# Patient Record
Sex: FEMALE | Race: WHITE | NOT HISPANIC OR LATINO | Employment: UNEMPLOYED | URBAN - METROPOLITAN AREA
[De-identification: names, ages, dates, MRNs, and addresses within clinical notes are randomized per-mention and may not be internally consistent; named-entity substitution may affect disease eponyms.]

---

## 2022-09-13 ENCOUNTER — TELEPHONE (OUTPATIENT)
Dept: OBSTETRICS AND GYNECOLOGY | Facility: CLINIC | Age: 27
End: 2022-09-13
Payer: COMMERCIAL

## 2022-09-13 NOTE — TELEPHONE ENCOUNTER
Called and scheduled an annual appointment Oct 5 at 8:00 am. Pt agreed with time and date.KP        ----- Message from Monica Butler sent at 9/13/2022  3:15 PM CDT -----  Name of Who is Calling: TIFFANY LUCERO [[id number]]           What is the request in detail: Patient is requesting a call back to schedule a NP appointment.  Please assist.           Can the clinic reply by [facility]: No           What Number to Call Back if not in [facility]: [phone]

## 2022-10-05 ENCOUNTER — PATIENT MESSAGE (OUTPATIENT)
Dept: OBSTETRICS AND GYNECOLOGY | Facility: CLINIC | Age: 27
End: 2022-10-05

## 2022-10-05 ENCOUNTER — OFFICE VISIT (OUTPATIENT)
Dept: OBSTETRICS AND GYNECOLOGY | Facility: CLINIC | Age: 27
End: 2022-10-05
Attending: ADVANCED PRACTICE MIDWIFE
Payer: COMMERCIAL

## 2022-10-05 ENCOUNTER — LAB VISIT (OUTPATIENT)
Dept: LAB | Facility: OTHER | Age: 27
End: 2022-10-05
Attending: ADVANCED PRACTICE MIDWIFE
Payer: COMMERCIAL

## 2022-10-05 VITALS
SYSTOLIC BLOOD PRESSURE: 108 MMHG | WEIGHT: 164.38 LBS | BODY MASS INDEX: 27.39 KG/M2 | DIASTOLIC BLOOD PRESSURE: 70 MMHG | HEIGHT: 65 IN

## 2022-10-05 DIAGNOSIS — Z78.9 VEGETARIAN DIET: ICD-10-CM

## 2022-10-05 DIAGNOSIS — Z13.0 SCREENING FOR IRON DEFICIENCY ANEMIA: ICD-10-CM

## 2022-10-05 DIAGNOSIS — Z91.89 AT RISK FOR BREASTFEEDING DIFFICULTY: ICD-10-CM

## 2022-10-05 DIAGNOSIS — Z87.42 HX OF ABNORMAL CERVICAL PAP SMEAR: ICD-10-CM

## 2022-10-05 DIAGNOSIS — Z01.419 ENCOUNTER FOR GYNECOLOGICAL EXAMINATION WITHOUT ABNORMAL FINDING: ICD-10-CM

## 2022-10-05 DIAGNOSIS — R63.5 WEIGHT GAIN: ICD-10-CM

## 2022-10-05 DIAGNOSIS — Z12.4 SCREENING FOR MALIGNANT NEOPLASM OF CERVIX: ICD-10-CM

## 2022-10-05 DIAGNOSIS — R79.0 DECREASED FERRITIN: ICD-10-CM

## 2022-10-05 DIAGNOSIS — Z31.69 ENCOUNTER FOR PRECONCEPTION CONSULTATION: ICD-10-CM

## 2022-10-05 DIAGNOSIS — Z01.419 ENCOUNTER FOR GYNECOLOGICAL EXAMINATION WITHOUT ABNORMAL FINDING: Primary | ICD-10-CM

## 2022-10-05 DIAGNOSIS — R74.01 ELEVATED ALANINE AMINOTRANSFERASE (ALT) LEVEL: Primary | ICD-10-CM

## 2022-10-05 LAB
ALBUMIN SERPL BCP-MCNC: 4.4 G/DL (ref 3.5–5.2)
ALP SERPL-CCNC: 70 U/L (ref 55–135)
ALT SERPL W/O P-5'-P-CCNC: 60 U/L (ref 10–44)
ANION GAP SERPL CALC-SCNC: 8 MMOL/L (ref 8–16)
AST SERPL-CCNC: 40 U/L (ref 10–40)
BASOPHILS # BLD AUTO: 0.04 K/UL (ref 0–0.2)
BASOPHILS NFR BLD: 0.7 % (ref 0–1.9)
BILIRUB SERPL-MCNC: 0.5 MG/DL (ref 0.1–1)
BUN SERPL-MCNC: 8 MG/DL (ref 6–20)
CALCIUM SERPL-MCNC: 9.3 MG/DL (ref 8.7–10.5)
CHLORIDE SERPL-SCNC: 107 MMOL/L (ref 95–110)
CO2 SERPL-SCNC: 24 MMOL/L (ref 23–29)
CREAT SERPL-MCNC: 0.8 MG/DL (ref 0.5–1.4)
DIFFERENTIAL METHOD: NORMAL
EOSINOPHIL # BLD AUTO: 0.1 K/UL (ref 0–0.5)
EOSINOPHIL NFR BLD: 1.8 % (ref 0–8)
ERYTHROCYTE [DISTWIDTH] IN BLOOD BY AUTOMATED COUNT: 11.9 % (ref 11.5–14.5)
EST. GFR  (NO RACE VARIABLE): >60 ML/MIN/1.73 M^2
FERRITIN SERPL-MCNC: 62 NG/ML (ref 20–300)
GLUCOSE SERPL-MCNC: 92 MG/DL (ref 70–110)
HCT VFR BLD AUTO: 40.4 % (ref 37–48.5)
HGB BLD-MCNC: 13.6 G/DL (ref 12–16)
IMM GRANULOCYTES # BLD AUTO: 0.01 K/UL (ref 0–0.04)
IMM GRANULOCYTES NFR BLD AUTO: 0.2 % (ref 0–0.5)
IRON SERPL-MCNC: 71 UG/DL (ref 30–160)
LYMPHOCYTES # BLD AUTO: 1.8 K/UL (ref 1–4.8)
LYMPHOCYTES NFR BLD: 32.2 % (ref 18–48)
MCH RBC QN AUTO: 30.7 PG (ref 27–31)
MCHC RBC AUTO-ENTMCNC: 33.7 G/DL (ref 32–36)
MCV RBC AUTO: 91 FL (ref 82–98)
MONOCYTES # BLD AUTO: 0.4 K/UL (ref 0.3–1)
MONOCYTES NFR BLD: 6.7 % (ref 4–15)
NEUTROPHILS # BLD AUTO: 3.2 K/UL (ref 1.8–7.7)
NEUTROPHILS NFR BLD: 58.4 % (ref 38–73)
NRBC BLD-RTO: 0 /100 WBC
PLATELET # BLD AUTO: 204 K/UL (ref 150–450)
PMV BLD AUTO: 10.9 FL (ref 9.2–12.9)
POTASSIUM SERPL-SCNC: 4.4 MMOL/L (ref 3.5–5.1)
PROT SERPL-MCNC: 7.4 G/DL (ref 6–8.4)
RBC # BLD AUTO: 4.43 M/UL (ref 4–5.4)
SATURATED IRON: 17 % (ref 20–50)
SODIUM SERPL-SCNC: 139 MMOL/L (ref 136–145)
TOTAL IRON BINDING CAPACITY: 410 UG/DL (ref 250–450)
TRANSFERRIN SERPL-MCNC: 277 MG/DL (ref 200–375)
TSH SERPL DL<=0.005 MIU/L-ACNC: 2.18 UIU/ML (ref 0.4–4)
WBC # BLD AUTO: 5.52 K/UL (ref 3.9–12.7)

## 2022-10-05 PROCEDURE — 80053 COMPREHEN METABOLIC PANEL: CPT | Performed by: ADVANCED PRACTICE MIDWIFE

## 2022-10-05 PROCEDURE — 84466 ASSAY OF TRANSFERRIN: CPT | Performed by: ADVANCED PRACTICE MIDWIFE

## 2022-10-05 PROCEDURE — 3074F SYST BP LT 130 MM HG: CPT | Mod: CPTII,S$GLB,, | Performed by: ADVANCED PRACTICE MIDWIFE

## 2022-10-05 PROCEDURE — 1159F MED LIST DOCD IN RCRD: CPT | Mod: CPTII,S$GLB,, | Performed by: ADVANCED PRACTICE MIDWIFE

## 2022-10-05 PROCEDURE — 3008F BODY MASS INDEX DOCD: CPT | Mod: CPTII,S$GLB,, | Performed by: ADVANCED PRACTICE MIDWIFE

## 2022-10-05 PROCEDURE — 3078F DIAST BP <80 MM HG: CPT | Mod: CPTII,S$GLB,, | Performed by: ADVANCED PRACTICE MIDWIFE

## 2022-10-05 PROCEDURE — 3078F PR MOST RECENT DIASTOLIC BLOOD PRESSURE < 80 MM HG: ICD-10-PCS | Mod: CPTII,S$GLB,, | Performed by: ADVANCED PRACTICE MIDWIFE

## 2022-10-05 PROCEDURE — 88175 CYTOPATH C/V AUTO FLUID REDO: CPT | Performed by: ADVANCED PRACTICE MIDWIFE

## 2022-10-05 PROCEDURE — 82728 ASSAY OF FERRITIN: CPT | Performed by: ADVANCED PRACTICE MIDWIFE

## 2022-10-05 PROCEDURE — 1159F PR MEDICATION LIST DOCUMENTED IN MEDICAL RECORD: ICD-10-PCS | Mod: CPTII,S$GLB,, | Performed by: ADVANCED PRACTICE MIDWIFE

## 2022-10-05 PROCEDURE — 99999 PR PBB SHADOW E&M-EST. PATIENT-LVL III: ICD-10-PCS | Mod: PBBFAC,,, | Performed by: ADVANCED PRACTICE MIDWIFE

## 2022-10-05 PROCEDURE — 85025 COMPLETE CBC W/AUTO DIFF WBC: CPT | Performed by: ADVANCED PRACTICE MIDWIFE

## 2022-10-05 PROCEDURE — 99999 PR PBB SHADOW E&M-EST. PATIENT-LVL III: CPT | Mod: PBBFAC,,, | Performed by: ADVANCED PRACTICE MIDWIFE

## 2022-10-05 PROCEDURE — 99385 PR PREVENTIVE VISIT,NEW,18-39: ICD-10-PCS | Mod: S$GLB,,, | Performed by: ADVANCED PRACTICE MIDWIFE

## 2022-10-05 PROCEDURE — 3074F PR MOST RECENT SYSTOLIC BLOOD PRESSURE < 130 MM HG: ICD-10-PCS | Mod: CPTII,S$GLB,, | Performed by: ADVANCED PRACTICE MIDWIFE

## 2022-10-05 PROCEDURE — 3008F PR BODY MASS INDEX (BMI) DOCUMENTED: ICD-10-PCS | Mod: CPTII,S$GLB,, | Performed by: ADVANCED PRACTICE MIDWIFE

## 2022-10-05 PROCEDURE — 99385 PREV VISIT NEW AGE 18-39: CPT | Mod: S$GLB,,, | Performed by: ADVANCED PRACTICE MIDWIFE

## 2022-10-05 PROCEDURE — 84443 ASSAY THYROID STIM HORMONE: CPT | Performed by: ADVANCED PRACTICE MIDWIFE

## 2022-10-05 PROCEDURE — 36415 COLL VENOUS BLD VENIPUNCTURE: CPT | Performed by: ADVANCED PRACTICE MIDWIFE

## 2022-10-05 NOTE — PROGRESS NOTES
Subjective:       Adina Jenkins is a 27 y.o. female here for a routine annual exam.  Current complaints: She is   discontinued OCP in April (had been on OC x 10-12 years, desires pregnancy. She presents today to establish care, and desires pregnancy.  Preconception Counseling completed and informational handout sent via my chart.            Gynecologic History  Patient's last menstrual period was 2022 (exact date).  Contraception: none  Last Pap: >/= 2-3 years. Results were: normal--no history of abnormal pap smears.  Last mammogram: never. Most Recent PCP exam less than one year ago and patient reports she had a cbc and cmp collected. Since d/c of OCP has gained about 7-10 lbs. Denies hx of thyroid screening with labs. Exercises occasionally, overall balanced diet. NO hx of medication changes or other medical conditions, anxiety--no meds/ regular interaction with therapist.     Obstetric History  OB History   No obstetric history on file.     Never been pregnant--denies family history for she or FOB of poor ob outcome, birth defects, HSV/ other infectious risk/ concern, no heart defects/ autism or other concerning/ relevant history.  is AA/ possible family history of sickle cell train and or d/o. He is Rod. Neither has undergone carrier screening-info reviewed/ handout provided (will contact CNM/ or office if desires preconceptionally)    The following portions of the patient's history were reviewed and updated as appropriate: allergies, current medications, past family history, past medical history, past social history, past surgical history, and problem list and updated.   Review of Systems  Eyes: negative for redness and visual disturbance  Ears, nose, mouth, throat, and face: negative for ear drainage, earaches, nasal congestion, and sore throat  Respiratory: negative for cough, dyspnea on exertion, and wheezing  Cardiovascular: negative for chest pain, dyspnea, fatigue, palpitations,  "and palpitations  Gastrointestinal: negative for abdominal pain, bright red blood per rectum, change in bowel habits, constipation, diarrhea, and nausea  Genitourinary:negative for dysuria, frequency, and urinary incontinence  Integument/breast: negative for breast lump, breast tenderness, changed mole, nipple discharge, rash, and skin lesion(s)  Hematologic/lymphatic: negative for easy bruising and petechiae  Musculoskeletal:negative for back pain, muscle weakness, and stiff joints  Neurological: negative for dizziness, gait problems, headaches, and weakness  Behavioral/Psych: negative for abusive relationship, aggressive behavior, depression, irritability, mood swings, sexual difficulty, sleep disturbance, and tobacco use  Endocrine: negative for diabetic symptoms including polydipsia, polyuria, and weight loss and fertility problems  Allergic/Immunologic: negative for hay fever      Objective:        /70   Ht 5' 5" (1.651 m)   Wt 74.6 kg (164 lb 5.7 oz)   LMP 09/16/2022 (Exact Date)   BMI 27.35 kg/m²     General Appearance:    Alert, cooperative, no distress, appears stated age   Head:    Normocephalic, without obvious abnormality, atraumatic   Eyes:    PERRL, conjunctiva/corneas clear   Ears:    Normal TM's and external ear canals, both ears   Nose:   Nares normal, septum midline, mucosa normal, no drainage    or sinus tenderness   Throat:   Lips, mucosa, and tongue normal; teeth and gums normal   Neck:   Supple, symmetrical, trachea midline, no adenopathy;     thyroid:  no enlargement/tenderness/nodules; no carotid    bruit or JVD   Back:     Symmetric, no curvature, ROM normal, no CVA tenderness   Lungs:     Clear to auscultation bilaterally, respirations unlabored   Chest Wall:    No tenderness or deformity    Heart:    Regular rate and rhythm, S1 and S2 normal, no murmur, rub   or gallop   Breast Exam:    No tenderness, masses, or skin changes bilaterally. No axillary or clavicular lymphadenopathy. " L nipple flat--advised regarding possible infant breastfeeding challenge with latch coconsider breast shells at 35-36w gestation.   Abdomen:     Soft, non-tender, bowel sounds active all four quadrants,     no masses, no organomegaly   Genitalia:    Normal female without lesion external or internal lesions, discharge or tenderness. -CMT, no cervicla lesions (pap smear collected), no adnexal masses, tenderness or fullness.    Rectal:    Normal tone, no masses or tenderness;    guaiac negative stool   Extremities:   Extremities normal, atraumatic, no cyanosis or edema   Pulses:   2+ and symmetric all extremities   Skin:   Skin color, texture, turgor normal, no rashes or lesions   Lymph nodes:   Cervical, supraclavicular, and axillary nodes normal   Neurologic:   Alert, oriented x 3, normal gait.          Assessment:     Diagnosis     1. Encounter for gynecological examination without abnormal finding    2. Screening for malignant neoplasm of cervix    3. Weight gain    4. Screening for iron deficiency anemia      .      Plan:   Preconception Counseling:   Education reviewed: self breast exams and taught and encouraged monthly.  Long discussion re: preconception health, timing of intercourse/ definition of normal cycles and ovulatory symptoms.     Preconception recommendations:  Track cycles using solo or calendar- the first day of your period is the first day of your cycle.  Your cycle length is the number of days from the start of one period to the start of the next period.  Consider using Ovulation predictor kits starting a few days before mid-point of cycle and using every morning until positive, once positive, we can check a progesterone level in the lab 7 days later. (remember, the f1rst day of your period is the first day of your cycle).  Regular exercise and healthy diet- please let us know if you would like a referral to a dietician  Take a Prenatal vitamin daily, this should have 800 mcg of folic acid/  day  Consider supplementation of Omega fatty acids.  Maximize health with your primary care doctor, make sure that thyroid function is normal, minimal to no anemia, etc     GYN Preventative Health:  Remainder of annual labs ordered, added TSH *due to hx of recent weight gain and anemia/ iron def screening (vegetarian/ pescetarian)  Pap Smear collected--ASCCP guidelines discussed and reviewed. Next pap due in 3 years if results NORMAL  -reports hx of frequent yeast inf while on OC--only one since d/c. Curious safe OTC meds in case she gets pregnant--advised regarding this and recommendations/ evidence.     Orders Placed This Encounter   Procedures    TSH     Standing Status:   Future     Number of Occurrences:   1     Standing Expiration Date:   12/4/2023    CBC Auto Differential     Standing Status:   Future     Number of Occurrences:   1     Standing Expiration Date:   12/4/2023    Comprehensive Metabolic Panel     Standing Status:   Future     Number of Occurrences:   1     Standing Expiration Date:   12/4/2023    FERRITIN     Standing Status:   Future     Number of Occurrences:   1     Standing Expiration Date:   12/4/2023    IRON AND TIBC     Standing Status:   Future     Number of Occurrences:   1     Standing Expiration Date:   12/4/2023     F/u in 3-6 months.  Ramonita Griffith CNM, MSN  10/05/2022  10:26 PM

## 2022-10-13 LAB
FINAL PATHOLOGIC DIAGNOSIS: NORMAL
Lab: NORMAL

## 2022-11-15 ENCOUNTER — HOSPITAL ENCOUNTER (EMERGENCY)
Facility: OTHER | Age: 27
Discharge: HOME | End: 2022-11-15
Attending: EMERGENCY MEDICINE
Payer: COMMERCIAL

## 2022-11-15 VITALS
SYSTOLIC BLOOD PRESSURE: 106 MMHG | TEMPERATURE: 98 F | HEART RATE: 67 BPM | BODY MASS INDEX: 26.66 KG/M2 | OXYGEN SATURATION: 100 % | WEIGHT: 160 LBS | RESPIRATION RATE: 20 BRPM | HEIGHT: 65 IN | DIASTOLIC BLOOD PRESSURE: 69 MMHG

## 2022-11-15 DIAGNOSIS — R11.2 NAUSEA AND VOMITING, UNSPECIFIED VOMITING TYPE: Primary | ICD-10-CM

## 2022-11-15 LAB
ALBUMIN SERPL BCP-MCNC: 4.7 G/DL (ref 3.5–5.2)
ALP SERPL-CCNC: 69 U/L (ref 55–135)
ALT SERPL W/O P-5'-P-CCNC: 44 U/L (ref 10–44)
ANION GAP SERPL CALC-SCNC: 11 MMOL/L (ref 8–16)
AST SERPL-CCNC: 37 U/L (ref 10–40)
B-HCG UR QL: NEGATIVE
BASOPHILS # BLD AUTO: 0.01 K/UL (ref 0–0.2)
BASOPHILS NFR BLD: 0.1 % (ref 0–1.9)
BILIRUB SERPL-MCNC: 0.6 MG/DL (ref 0.1–1)
BILIRUB UR QL STRIP: NEGATIVE
BUN SERPL-MCNC: 12 MG/DL (ref 6–20)
CALCIUM SERPL-MCNC: 9.5 MG/DL (ref 8.7–10.5)
CHLORIDE SERPL-SCNC: 104 MMOL/L (ref 95–110)
CLARITY UR: CLEAR
CO2 SERPL-SCNC: 23 MMOL/L (ref 23–29)
COLOR UR: YELLOW
CREAT SERPL-MCNC: 0.8 MG/DL (ref 0.5–1.4)
CTP QC/QA: YES
DIFFERENTIAL METHOD: ABNORMAL
EOSINOPHIL # BLD AUTO: 0 K/UL (ref 0–0.5)
EOSINOPHIL NFR BLD: 0.3 % (ref 0–8)
ERYTHROCYTE [DISTWIDTH] IN BLOOD BY AUTOMATED COUNT: 12 % (ref 11.5–14.5)
EST. GFR  (NO RACE VARIABLE): >60 ML/MIN/1.73 M^2
GLUCOSE SERPL-MCNC: 95 MG/DL (ref 70–110)
GLUCOSE UR QL STRIP: NEGATIVE
HCT VFR BLD AUTO: 41.9 % (ref 37–48.5)
HGB BLD-MCNC: 14.3 G/DL (ref 12–16)
HGB UR QL STRIP: ABNORMAL
IMM GRANULOCYTES # BLD AUTO: 0 K/UL (ref 0–0.04)
IMM GRANULOCYTES NFR BLD AUTO: 0 % (ref 0–0.5)
KETONES UR QL STRIP: ABNORMAL
LEUKOCYTE ESTERASE UR QL STRIP: NEGATIVE
LYMPHOCYTES # BLD AUTO: 1.3 K/UL (ref 1–4.8)
LYMPHOCYTES NFR BLD: 17.2 % (ref 18–48)
MCH RBC QN AUTO: 30.8 PG (ref 27–31)
MCHC RBC AUTO-ENTMCNC: 34.1 G/DL (ref 32–36)
MCV RBC AUTO: 90 FL (ref 82–98)
MONOCYTES # BLD AUTO: 0.2 K/UL (ref 0.3–1)
MONOCYTES NFR BLD: 3.2 % (ref 4–15)
NEUTROPHILS # BLD AUTO: 5.8 K/UL (ref 1.8–7.7)
NEUTROPHILS NFR BLD: 79.2 % (ref 38–73)
NITRITE UR QL STRIP: NEGATIVE
NRBC BLD-RTO: 0 /100 WBC
PH UR STRIP: 6 [PH] (ref 5–8)
PLATELET # BLD AUTO: 202 K/UL (ref 150–450)
PMV BLD AUTO: 11.1 FL (ref 9.2–12.9)
POTASSIUM SERPL-SCNC: 4.4 MMOL/L (ref 3.5–5.1)
PROT SERPL-MCNC: 7.7 G/DL (ref 6–8.4)
PROT UR QL STRIP: NEGATIVE
RBC # BLD AUTO: 4.64 M/UL (ref 4–5.4)
SODIUM SERPL-SCNC: 138 MMOL/L (ref 136–145)
SP GR UR STRIP: >=1.03 (ref 1–1.03)
URN SPEC COLLECT METH UR: ABNORMAL
UROBILINOGEN UR STRIP-ACNC: NEGATIVE EU/DL
WBC # BLD AUTO: 7.26 K/UL (ref 3.9–12.7)

## 2022-11-15 PROCEDURE — 96361 HYDRATE IV INFUSION ADD-ON: CPT

## 2022-11-15 PROCEDURE — 81003 URINALYSIS AUTO W/O SCOPE: CPT | Performed by: PHYSICIAN ASSISTANT

## 2022-11-15 PROCEDURE — 81025 URINE PREGNANCY TEST: CPT | Performed by: PHYSICIAN ASSISTANT

## 2022-11-15 PROCEDURE — 80053 COMPREHEN METABOLIC PANEL: CPT | Performed by: PHYSICIAN ASSISTANT

## 2022-11-15 PROCEDURE — 96374 THER/PROPH/DIAG INJ IV PUSH: CPT

## 2022-11-15 PROCEDURE — 25000003 PHARM REV CODE 250: Performed by: PHYSICIAN ASSISTANT

## 2022-11-15 PROCEDURE — 96375 TX/PRO/DX INJ NEW DRUG ADDON: CPT

## 2022-11-15 PROCEDURE — 63600175 PHARM REV CODE 636 W HCPCS: Performed by: EMERGENCY MEDICINE

## 2022-11-15 PROCEDURE — 85025 COMPLETE CBC W/AUTO DIFF WBC: CPT | Performed by: PHYSICIAN ASSISTANT

## 2022-11-15 PROCEDURE — 99284 EMERGENCY DEPT VISIT MOD MDM: CPT | Mod: 25,27

## 2022-11-15 RX ORDER — ONDANSETRON 2 MG/ML
4 INJECTION INTRAMUSCULAR; INTRAVENOUS
Status: DISCONTINUED | OUTPATIENT
Start: 2022-11-15 | End: 2022-11-15

## 2022-11-15 RX ORDER — ONDANSETRON 4 MG/1
4 TABLET, ORALLY DISINTEGRATING ORAL EVERY 8 HOURS PRN
Qty: 15 TABLET | Refills: 0 | Status: SHIPPED | OUTPATIENT
Start: 2022-11-15

## 2022-11-15 RX ORDER — DIPHENHYDRAMINE HYDROCHLORIDE 50 MG/ML
25 INJECTION INTRAMUSCULAR; INTRAVENOUS
Status: COMPLETED | OUTPATIENT
Start: 2022-11-15 | End: 2022-11-15

## 2022-11-15 RX ORDER — KETOROLAC TROMETHAMINE 30 MG/ML
30 INJECTION, SOLUTION INTRAMUSCULAR; INTRAVENOUS
Status: COMPLETED | OUTPATIENT
Start: 2022-11-15 | End: 2022-11-15

## 2022-11-15 RX ORDER — METOCLOPRAMIDE HYDROCHLORIDE 5 MG/ML
10 INJECTION INTRAMUSCULAR; INTRAVENOUS
Status: COMPLETED | OUTPATIENT
Start: 2022-11-15 | End: 2022-11-15

## 2022-11-15 RX ADMIN — KETOROLAC TROMETHAMINE 30 MG: 30 INJECTION, SOLUTION INTRAMUSCULAR; INTRAVENOUS at 01:11

## 2022-11-15 RX ADMIN — DIPHENHYDRAMINE HYDROCHLORIDE 25 MG: 50 INJECTION, SOLUTION INTRAMUSCULAR; INTRAVENOUS at 01:11

## 2022-11-15 RX ADMIN — METOCLOPRAMIDE 10 MG: 5 INJECTION, SOLUTION INTRAMUSCULAR; INTRAVENOUS at 01:11

## 2022-11-15 RX ADMIN — SODIUM CHLORIDE 1000 ML: 0.9 INJECTION, SOLUTION INTRAVENOUS at 01:11

## 2022-11-15 NOTE — ED PROVIDER NOTES
Encounter Date: 11/15/2022       History     Chief Complaint   Patient presents with    Vomiting     Several episodes of vomiting since yesterday morning. Denies abdominal pain, fever, urinary complaints.      27F h/o migraines presents with nausea and vomiting. Symptoms started yesterday, she has vomited multiple times (nonbloody). No abdominal pain, no fever/chills, no diarrhea, no urinary symptoms. She has had a migraine since yesterday, although she doesn't usually get vomiting with her migraines. Headache feels like a migraine without any new or unusual features (no visual changes, no neuro sx, no neck pain, etc etc). No known sick contacts. No new or suspicious foods. No recent travel. No abdominal surgeries.     Review of patient's allergies indicates:  No Known Allergies  Past Medical History:   Diagnosis Date    Abnormal Pap smear of cervix 2015    required repeat pap and normal, +HPV (garsasil vaccinated)    Anxiety disorder, unspecified     seeing therapist     No past surgical history on file.  Family History   Problem Relation Age of Onset    Diabetes type II Mother         orals    Diabetes type II Father         on insulin    Breast cancer Paternal Grandmother 70    Colon cancer Maternal Aunt     Ovarian cancer Neg Hx      Social History     Tobacco Use    Smoking status: Never    Smokeless tobacco: Never   Substance Use Topics    Alcohol use: Not Currently    Drug use: Never     Review of Systems   Constitutional:  Negative for chills and fever.   HENT:  Negative for sinus pressure and sore throat.    Eyes:  Negative for visual disturbance.   Respiratory:  Negative for cough and shortness of breath.    Cardiovascular:  Negative for chest pain.   Gastrointestinal:  Positive for nausea and vomiting. Negative for abdominal pain, constipation and diarrhea.   Genitourinary:  Negative for dysuria.   Musculoskeletal:  Negative for myalgias and neck pain.   Skin:  Negative for rash.   Neurological:  Positive  for headaches. Negative for facial asymmetry, speech difficulty, weakness and numbness.   Psychiatric/Behavioral:  Negative for confusion.    All other systems reviewed and are negative.    Physical Exam     Initial Vitals [11/15/22 1032]   BP Pulse Resp Temp SpO2   120/74 75 16 98.7 °F (37.1 °C) 100 %      MAP       --         Physical Exam    Constitutional: She appears well-developed and well-nourished. She is not diaphoretic. No distress.   HENT:   Head: Normocephalic.   Dry mucous membranes, dry lips   Eyes: EOM are normal.   Neck:   Normal range of motion.  Cardiovascular:  Normal rate and regular rhythm.           Pulmonary/Chest: Breath sounds normal.   Abdominal: Abdomen is soft. There is no abdominal tenderness.   Musculoskeletal:         General: Normal range of motion.      Cervical back: Normal range of motion.     Neurological: She is alert and oriented to person, place, and time.   Skin: Skin is warm and dry.   Psychiatric: She has a normal mood and affect.       ED Course   Procedures  Labs Reviewed   CBC W/ AUTO DIFFERENTIAL - Abnormal; Notable for the following components:       Result Value    Mono # 0.2 (*)     Gran % 79.2 (*)     Lymph % 17.2 (*)     Mono % 3.2 (*)     All other components within normal limits   URINALYSIS, REFLEX TO URINE CULTURE - Abnormal; Notable for the following components:    Specific Gravity, UA >=1.030 (*)     Ketones, UA 2+ (*)     Occult Blood UA Trace (*)     All other components within normal limits    Narrative:     Specimen Source->Urine   COMPREHENSIVE METABOLIC PANEL   POCT URINE PREGNANCY          Imaging Results    None          Medications   sodium chloride 0.9% bolus 1,000 mL (0 mLs Intravenous Stopped 11/15/22 1554)   ketorolac injection 30 mg (30 mg Intravenous Given 11/15/22 1338)   metoclopramide HCl injection 10 mg (10 mg Intravenous Given 11/15/22 1340)   diphenhydrAMINE injection 25 mg (25 mg Intravenous Given 11/15/22 1339)     Medical Decision  Making:   ED Management:  27F h/o migraines p/w n/v x 2 days, no abd pain. +Migraine (doesn't usually get n/v with them, but otherwise typical).     Afebrile, nontoxic, NAD. Full painless ROM in neck, neuro intact. Abd soft NT to deep palpation throughout.    Labs ordered from triage unremarkable including CBC, CMP, UA/Upreg    Ddx viral GI illness, food poisoning, migraine    Plan:  -IV Toradol, Reglan, Benadryl  -IVNS bolus  -Serial exams    3:00pm  Pt feeling better after initial management, states she is tired and wants to go home and rest. No vomiting here. Will PO challenge and likely DC home with short course of Zofran to use prn. Careful RTED instructions given.                         Clinical Impression:   Final diagnoses:  [R11.2] Nausea and vomiting, unspecified vomiting type (Primary)      ED Disposition Condition    Discharge Stable          ED Prescriptions       Medication Sig Dispense Start Date End Date Auth. Provider    ondansetron (ZOFRAN-ODT) 4 MG TbDL Take 1 tablet (4 mg total) by mouth every 8 (eight) hours as needed (nausea). 15 tablet 11/15/2022 -- Estefani Zamora MD          Follow-up Information    None          Estefani Zamora MD  11/15/22 2001

## 2022-11-15 NOTE — DISCHARGE INSTRUCTIONS
You were seen in the ER today for nausea, vomiting, and headache. Your bloodwork and urine tests did not show any abnormalities. We discussed:    Rest and drink plenty of fluids  Take Zofran as needed for nausea/vomiting    If you are not feeling significantly better in 48-72 hours, follow-up with your PCP or here in the ER for a recheck. Return to the ER immediately for severe pain, or if you are not able to keep liquids down due to vomiting.

## 2022-11-15 NOTE — FIRST PROVIDER EVALUATION
Emergency Department TeleTriage Encounter Note      CHIEF COMPLAINT    Chief Complaint   Patient presents with    Vomiting     Several episodes of vomiting since yesterday morning. Denies abdominal pain, fever, urinary complaints.        VITAL SIGNS   Initial Vitals [11/15/22 1032]   BP Pulse Resp Temp SpO2   120/74 75 16 98.7 °F (37.1 °C) 100 %      MAP       --            ALLERGIES    Review of patient's allergies indicates:  No Known Allergies    PROVIDER TRIAGE NOTE    26 y/o female presents to the ER with complaint of vomiting x 2 days. She is concerned for dehydration. She reports headache, intermittent abdominal pain with vomiting.  No medications taken today. She reports nasal congestion, but denies cough , fever, or body aches.    Patient nontoxic appearing.  VSS.  Will order labs, IV fluids, zofran pending ED provider evaluation.    Initial orders will be placed and care will be transferred to an alternate provider when patient is roomed for a full evaluation. Any additional orders and the final disposition will be determined by that provider.         ORDERS  Labs Reviewed - No data to display    ED Orders (720h ago, onward)      None              Virtual Visit Note: The provider triage portion of this emergency department evaluation and documentation was performed via [employer], a HIPAA-compliant telemedicine application, in concert with a tele-presenter in the room. A face to face patient evaluation with one of my colleagues will occur once the patient is placed in an emergency department room.      DISCLAIMER: This note was prepared with [employer] voice recognition transcription software. Garbled syntax, mangled pronouns, and other bizarre constructions may be attributed to that software system.

## 2023-01-22 ENCOUNTER — PATIENT MESSAGE (OUTPATIENT)
Dept: OBSTETRICS AND GYNECOLOGY | Facility: CLINIC | Age: 28
End: 2023-01-22
Payer: COMMERCIAL

## 2023-01-22 DIAGNOSIS — Z31.69 INFERTILITY COUNSELING: Primary | ICD-10-CM

## 2023-03-09 ENCOUNTER — OFFICE VISIT (OUTPATIENT)
Dept: ENDOCRINOLOGY | Facility: CLINIC | Age: 28
End: 2023-03-09
Payer: COMMERCIAL

## 2023-03-09 VITALS
DIASTOLIC BLOOD PRESSURE: 72 MMHG | SYSTOLIC BLOOD PRESSURE: 104 MMHG | OXYGEN SATURATION: 95 % | HEIGHT: 65 IN | WEIGHT: 167.13 LBS | HEART RATE: 76 BPM | BODY MASS INDEX: 27.85 KG/M2

## 2023-03-09 DIAGNOSIS — Z31.69 INFERTILITY COUNSELING: ICD-10-CM

## 2023-03-09 DIAGNOSIS — N92.6 IRREGULAR MENSTRUAL CYCLE: ICD-10-CM

## 2023-03-09 PROCEDURE — 99204 PR OFFICE/OUTPT VISIT, NEW, LEVL IV, 45-59 MIN: ICD-10-PCS | Mod: S$GLB,,, | Performed by: INTERNAL MEDICINE

## 2023-03-09 PROCEDURE — 99204 OFFICE O/P NEW MOD 45 MIN: CPT | Mod: S$GLB,,, | Performed by: INTERNAL MEDICINE

## 2023-03-09 PROCEDURE — 1159F PR MEDICATION LIST DOCUMENTED IN MEDICAL RECORD: ICD-10-PCS | Mod: CPTII,S$GLB,, | Performed by: INTERNAL MEDICINE

## 2023-03-09 PROCEDURE — 3078F DIAST BP <80 MM HG: CPT | Mod: CPTII,S$GLB,, | Performed by: INTERNAL MEDICINE

## 2023-03-09 PROCEDURE — 3008F PR BODY MASS INDEX (BMI) DOCUMENTED: ICD-10-PCS | Mod: CPTII,S$GLB,, | Performed by: INTERNAL MEDICINE

## 2023-03-09 PROCEDURE — 1159F MED LIST DOCD IN RCRD: CPT | Mod: CPTII,S$GLB,, | Performed by: INTERNAL MEDICINE

## 2023-03-09 PROCEDURE — 3008F BODY MASS INDEX DOCD: CPT | Mod: CPTII,S$GLB,, | Performed by: INTERNAL MEDICINE

## 2023-03-09 PROCEDURE — 3074F PR MOST RECENT SYSTOLIC BLOOD PRESSURE < 130 MM HG: ICD-10-PCS | Mod: CPTII,S$GLB,, | Performed by: INTERNAL MEDICINE

## 2023-03-09 PROCEDURE — 99999 PR PBB SHADOW E&M-EST. PATIENT-LVL III: ICD-10-PCS | Mod: PBBFAC,,, | Performed by: INTERNAL MEDICINE

## 2023-03-09 PROCEDURE — 3074F SYST BP LT 130 MM HG: CPT | Mod: CPTII,S$GLB,, | Performed by: INTERNAL MEDICINE

## 2023-03-09 PROCEDURE — 99999 PR PBB SHADOW E&M-EST. PATIENT-LVL III: CPT | Mod: PBBFAC,,, | Performed by: INTERNAL MEDICINE

## 2023-03-09 PROCEDURE — 3078F PR MOST RECENT DIASTOLIC BLOOD PRESSURE < 80 MM HG: ICD-10-PCS | Mod: CPTII,S$GLB,, | Performed by: INTERNAL MEDICINE

## 2023-03-09 NOTE — ASSESSMENT & PLAN NOTE
Has regular menstrual bleeding, but shortened luteal phase   Would like to check prolactin levels

## 2023-03-09 NOTE — PROGRESS NOTES
Subjective:      Patient ID: Adina Jenkins is a 27 y.o. female.    Chief Complaint:  Polycystic Ovary Syndrome (Weight gain, abnormal menstrual cycle since being off birth control pills. )      History of Present Illness    Since stopping OCPs 4/23/3022 to achieve pregnancy.   Cycle length 27 days in length since that time   Occasional migraine before menstrual bleeding. Has mild pelvic cramps, acne around the time of ovulation, breast tenderness before menstrual cycles.     [facility]     Today reports:   Reports shortened luteal phase   Weight gain     Menarche 10 years of age  Started OCPs at age [age], started for migraine and painful menstrual cycles     Denies galactorrhea.   Denies striae  Denies proximal muscle weakness, denies bruising  Denies skin tags     Denies sleep apnea, T2DM, hypertension, hyperlipidemia.     Supplements:  Prenatal vitamin   Coq10 for migraines   B6  Magnesium     Medications: zofran    Weight gain: has gained about 12 lbs   Dietary habits:  Avoids alcohol or caffeine   Reports no major changes to habits  Exercise: gym 3 times weekly   Cardio daily     Review of Systems  Denies recent illness   Cold last week     Objective:   Physical Exam  Constitutional:       General: She is not in acute distress.     Appearance: Normal appearance. She is well-developed.   Eyes:      General: No scleral icterus.     Extraocular Movements: Extraocular movements intact.      Conjunctiva/sclera: Conjunctivae normal.      Pupils: Pupils are equal, round, and reactive to light.      Comments: No proptosis.    Neck:      Thyroid: No thyromegaly.      Trachea: No tracheal deviation.   Cardiovascular:      Rate and Rhythm: Normal rate.   Pulmonary:      Effort: Pulmonary effort is normal.      Breath sounds: Normal breath sounds.   Musculoskeletal:      Cervical back: Normal range of motion and neck supple.   Lymphadenopathy:      Cervical: No cervical adenopathy.   Skin:      "General: Skin is warm and dry.      Findings: No rash.   Neurological:      Mental Status: She is alert.      Deep Tendon Reflexes: Reflexes are normal and symmetric.      Comments: No tremor.     Vitals:    03/09/23 1310   BP: 104/72   BP Location: Left arm   Patient Position: Sitting   BP Method: Medium (Manual)   Pulse: 76   SpO2: 95%   Weight: 75.8 kg (167 lb 1.7 oz)   Height: 5' 5" (1.651 m)       BP Readings from Last 3 Encounters:   03/09/23 104/72   11/15/22 106/69   10/05/22 108/70     Wt Readings from Last 1 Encounters:   03/09/23 1310 75.8 kg (167 lb 1.7 oz)         Body mass index is 27.81 kg/m².    Lab Review:   No results found for: HGBA1C  No results found for: CHOL, HDL, LDLCALC, TRIG, CHOLHDL  Lab Results   Component Value Date     11/15/2022    K 4.4 11/15/2022     11/15/2022    CO2 23 11/15/2022    GLU 95 11/15/2022    BUN 12 11/15/2022    CREATININE 0.8 11/15/2022    CALCIUM 9.5 11/15/2022    PROT 7.7 11/15/2022    ALBUMIN 4.7 11/15/2022    BILITOT 0.6 11/15/2022    ALKPHOS 69 11/15/2022    AST 37 11/15/2022    ALT 44 11/15/2022    ANIONGAP 11 11/15/2022    TSH 2.178 10/05/2022         Assessment and Plan     Irregular menstrual cycle  Has regular menstrual bleeding, but shortened luteal phase   Would like to check prolactin levels           "

## 2023-03-13 ENCOUNTER — LAB VISIT (OUTPATIENT)
Dept: LAB | Facility: OTHER | Age: 28
End: 2023-03-13
Attending: INTERNAL MEDICINE
Payer: COMMERCIAL

## 2023-03-13 DIAGNOSIS — N92.6 IRREGULAR MENSTRUAL CYCLE: ICD-10-CM

## 2023-03-13 LAB — PROLACTIN SERPL IA-MCNC: 20.3 NG/ML (ref 5.2–26.5)

## 2023-03-13 PROCEDURE — 36415 COLL VENOUS BLD VENIPUNCTURE: CPT | Performed by: INTERNAL MEDICINE

## 2023-03-13 PROCEDURE — 84146 ASSAY OF PROLACTIN: CPT | Performed by: INTERNAL MEDICINE

## 2023-06-05 ENCOUNTER — OFFICE VISIT (OUTPATIENT)
Dept: OBSTETRICS AND GYNECOLOGY | Facility: CLINIC | Age: 28
End: 2023-06-05
Payer: COMMERCIAL

## 2023-06-05 ENCOUNTER — LAB VISIT (OUTPATIENT)
Dept: LAB | Facility: HOSPITAL | Age: 28
End: 2023-06-05
Attending: STUDENT IN AN ORGANIZED HEALTH CARE EDUCATION/TRAINING PROGRAM
Payer: COMMERCIAL

## 2023-06-05 VITALS
WEIGHT: 161.19 LBS | DIASTOLIC BLOOD PRESSURE: 70 MMHG | HEIGHT: 65 IN | SYSTOLIC BLOOD PRESSURE: 118 MMHG | BODY MASS INDEX: 26.85 KG/M2

## 2023-06-05 DIAGNOSIS — Z31.41 FERTILITY TESTING: ICD-10-CM

## 2023-06-05 DIAGNOSIS — Z31.41 FERTILITY TESTING: Primary | ICD-10-CM

## 2023-06-05 LAB
ESTIMATED AVG GLUCOSE: 94 MG/DL (ref 68–131)
HBA1C MFR BLD: 4.9 % (ref 4–5.6)
PROGEST SERPL-MCNC: 13.1 NG/ML

## 2023-06-05 PROCEDURE — 3008F BODY MASS INDEX DOCD: CPT | Mod: CPTII,S$GLB,, | Performed by: STUDENT IN AN ORGANIZED HEALTH CARE EDUCATION/TRAINING PROGRAM

## 2023-06-05 PROCEDURE — 1159F PR MEDICATION LIST DOCUMENTED IN MEDICAL RECORD: ICD-10-PCS | Mod: CPTII,S$GLB,, | Performed by: STUDENT IN AN ORGANIZED HEALTH CARE EDUCATION/TRAINING PROGRAM

## 2023-06-05 PROCEDURE — 3074F PR MOST RECENT SYSTOLIC BLOOD PRESSURE < 130 MM HG: ICD-10-PCS | Mod: CPTII,S$GLB,, | Performed by: STUDENT IN AN ORGANIZED HEALTH CARE EDUCATION/TRAINING PROGRAM

## 2023-06-05 PROCEDURE — 3078F DIAST BP <80 MM HG: CPT | Mod: CPTII,S$GLB,, | Performed by: STUDENT IN AN ORGANIZED HEALTH CARE EDUCATION/TRAINING PROGRAM

## 2023-06-05 PROCEDURE — 84144 ASSAY OF PROGESTERONE: CPT | Performed by: STUDENT IN AN ORGANIZED HEALTH CARE EDUCATION/TRAINING PROGRAM

## 2023-06-05 PROCEDURE — 36415 COLL VENOUS BLD VENIPUNCTURE: CPT | Mod: PN | Performed by: STUDENT IN AN ORGANIZED HEALTH CARE EDUCATION/TRAINING PROGRAM

## 2023-06-05 PROCEDURE — 99214 PR OFFICE/OUTPT VISIT, EST, LEVL IV, 30-39 MIN: ICD-10-PCS | Mod: S$GLB,,, | Performed by: STUDENT IN AN ORGANIZED HEALTH CARE EDUCATION/TRAINING PROGRAM

## 2023-06-05 PROCEDURE — 83520 IMMUNOASSAY QUANT NOS NONAB: CPT | Performed by: STUDENT IN AN ORGANIZED HEALTH CARE EDUCATION/TRAINING PROGRAM

## 2023-06-05 PROCEDURE — 3008F PR BODY MASS INDEX (BMI) DOCUMENTED: ICD-10-PCS | Mod: CPTII,S$GLB,, | Performed by: STUDENT IN AN ORGANIZED HEALTH CARE EDUCATION/TRAINING PROGRAM

## 2023-06-05 PROCEDURE — 83036 HEMOGLOBIN GLYCOSYLATED A1C: CPT | Performed by: STUDENT IN AN ORGANIZED HEALTH CARE EDUCATION/TRAINING PROGRAM

## 2023-06-05 PROCEDURE — 99214 OFFICE O/P EST MOD 30 MIN: CPT | Mod: S$GLB,,, | Performed by: STUDENT IN AN ORGANIZED HEALTH CARE EDUCATION/TRAINING PROGRAM

## 2023-06-05 PROCEDURE — 3078F PR MOST RECENT DIASTOLIC BLOOD PRESSURE < 80 MM HG: ICD-10-PCS | Mod: CPTII,S$GLB,, | Performed by: STUDENT IN AN ORGANIZED HEALTH CARE EDUCATION/TRAINING PROGRAM

## 2023-06-05 PROCEDURE — 99999 PR PBB SHADOW E&M-EST. PATIENT-LVL III: CPT | Mod: PBBFAC,,, | Performed by: STUDENT IN AN ORGANIZED HEALTH CARE EDUCATION/TRAINING PROGRAM

## 2023-06-05 PROCEDURE — 1159F MED LIST DOCD IN RCRD: CPT | Mod: CPTII,S$GLB,, | Performed by: STUDENT IN AN ORGANIZED HEALTH CARE EDUCATION/TRAINING PROGRAM

## 2023-06-05 PROCEDURE — 99999 PR PBB SHADOW E&M-EST. PATIENT-LVL III: ICD-10-PCS | Mod: PBBFAC,,, | Performed by: STUDENT IN AN ORGANIZED HEALTH CARE EDUCATION/TRAINING PROGRAM

## 2023-06-05 PROCEDURE — 3074F SYST BP LT 130 MM HG: CPT | Mod: CPTII,S$GLB,, | Performed by: STUDENT IN AN ORGANIZED HEALTH CARE EDUCATION/TRAINING PROGRAM

## 2023-06-05 NOTE — PROGRESS NOTES
History & Physical  Gynecology      SUBJECTIVE:     Chief Complaint: preconception     History of Present Illness:  Adina Jenkins is a 27 y.o. No obstetric history on file. who presents with CC of infertility.   She reports infertility for 12 months. She was on lo lo estrin for contraception until April 2022 when she started TTC.    Significant PMH/PSH:  None    Obstetric History  G0    Ovarian  Regular cycles, every 28 days, bleeding x 3 days with heaviest flow on day 2. She does think her luteal phase is short based on tracking. Ovulates on day 14-19. Average 10-11 with the last 2 cycles having 12 day luteal phases.  She has no symptoms concerning for PCOS  She was doing OPKs at home for a while. Now doing BBT and cervical mucus charting. She feels like she has a pretty good idea of when she is ovulating.  Has never used Clomid/Letrozole  She has had TSH and prolactin checked but not reproductive hormones.    Uterine  No known history of fibroids, polyps  She has never had a pelvic US  No history of uterine surgery    Tubal  She does have a history of chlamydia in college. Was treated with outpatient antibiotics. Was symptomatic but did not have PID.  No known history of endometriosis or tubal surgery  Has not had HSG    Male factor  Her  is 33.  Patient and her current partner do not have any children together.  Partner does not have children with another woman.  No SA done.  Partner has no significant medical/surgical history  Partner does not use tobacco    Chronic medications:   None  Has been on supplements for years due to history of migraines.   Currently taking CoQ10, B complex, PNV, Vitamin C and D, and magnesium.    Substance Use:  Tobacco - No  Caffeine - No  Alcohol - No  Other drugs - No      She is from [place],  is from [place]. There are twins on both sides of their family but no other significant history.  She states her mom went through menopause at 40.    Review of patient's allergies  indicates:  No Known Allergies    Past Medical History:   Diagnosis Date    Abnormal Pap smear of cervix 2015    required repeat pap and normal, +HPV (garsasil vaccinated)    Anxiety disorder, unspecified     seeing therapist     History reviewed. No pertinent surgical history.  OB History    No obstetric history on file.       Family History   Problem Relation Age of Onset    Diabetes type II Mother         orals    Diabetes type II Father         on insulin    Colon cancer Maternal Aunt     Breast cancer Paternal Grandmother 70    Ovarian cancer Neg Hx      Social History     Tobacco Use    Smoking status: Never    Smokeless tobacco: Never   Substance Use Topics    Alcohol use: Not Currently    Drug use: Never       Current Outpatient Medications   Medication Sig    ondansetron (ZOFRAN-ODT) 4 MG TbDL Take 1 tablet (4 mg total) by mouth every 8 (eight) hours as needed (nausea).     No current facility-administered medications for this visit.         Review of Systems:  Review of Systems   Constitutional:  Negative for fever.   Respiratory:  Negative for shortness of breath.    Cardiovascular:  Negative for chest pain.   Gastrointestinal:  Negative for abdominal pain, nausea and vomiting.   Genitourinary:  Negative for menstrual problem and pelvic pain.   Neurological:  Positive for headaches.      OBJECTIVE:     Physical Exam:  Physical Exam  Vitals reviewed.   Constitutional:       General: She is not in acute distress.     Appearance: She is well-developed. She is not diaphoretic.   HENT:      Head: Normocephalic and atraumatic.      Nose: Nose normal.   Eyes:      Extraocular Movements: Extraocular movements intact.      Conjunctiva/sclera: Conjunctivae normal.   Cardiovascular:      Rate and Rhythm: Normal rate.   Pulmonary:      Effort: Pulmonary effort is normal. No respiratory distress.   Abdominal:      Palpations: Abdomen is soft.      Tenderness: There is no abdominal tenderness.   Musculoskeletal:          General: No tenderness. Normal range of motion.      Cervical back: Normal range of motion.   Skin:     General: Skin is warm and dry.   Neurological:      Mental Status: She is alert and oriented to person, place, and time.   Psychiatric:         Behavior: Behavior normal.         Thought Content: Thought content normal.         Judgment: Judgment normal.         ASSESSMENT:       ICD-10-CM ICD-9-CM    1. Fertility testing  Z31.41 V26.21 PROGESTERONE      ANTIMULLERIAN HORMONE (AMH)      HEMOGLOBIN A1C             Plan:      -- Today is 8 days s/p ovulation per her tracking. Would like to check P4 today.  -- Also discussed getting CD #3 labs with next period.  -- Clinical utility of AMH discussed. Also has family history of DM. Will check A1c.  -- Progesterone supplementation for short luteal phase discussed.   -- Consider pelvic US for eval for structural abnormalities.  -- Briefly discussed SA, referral for HSG if initial testing unremarkable.  -- Will follow up based on results.        Melanie Aguirre MD

## 2023-06-06 LAB — MIS SERPL-MCNC: 2.1 NG/ML (ref 0.89–9.9)

## 2023-06-12 ENCOUNTER — PATIENT MESSAGE (OUTPATIENT)
Dept: OBSTETRICS AND GYNECOLOGY | Facility: CLINIC | Age: 28
End: 2023-06-12
Payer: COMMERCIAL

## 2023-06-12 ENCOUNTER — LAB VISIT (OUTPATIENT)
Dept: LAB | Facility: OTHER | Age: 28
End: 2023-06-12
Attending: STUDENT IN AN ORGANIZED HEALTH CARE EDUCATION/TRAINING PROGRAM
Payer: COMMERCIAL

## 2023-06-12 DIAGNOSIS — Z31.41 FERTILITY TESTING: ICD-10-CM

## 2023-06-12 DIAGNOSIS — Z31.41 FERTILITY TESTING: Primary | ICD-10-CM

## 2023-06-12 LAB
ESTRADIOL SERPL-MCNC: 24 PG/ML
FSH SERPL-ACNC: 7.32 MIU/ML
LH SERPL-ACNC: 4 MIU/ML

## 2023-06-12 PROCEDURE — 36415 COLL VENOUS BLD VENIPUNCTURE: CPT | Performed by: STUDENT IN AN ORGANIZED HEALTH CARE EDUCATION/TRAINING PROGRAM

## 2023-06-12 PROCEDURE — 83001 ASSAY OF GONADOTROPIN (FSH): CPT | Performed by: STUDENT IN AN ORGANIZED HEALTH CARE EDUCATION/TRAINING PROGRAM

## 2023-06-12 PROCEDURE — 83002 ASSAY OF GONADOTROPIN (LH): CPT | Performed by: STUDENT IN AN ORGANIZED HEALTH CARE EDUCATION/TRAINING PROGRAM

## 2023-06-12 PROCEDURE — 82670 ASSAY OF TOTAL ESTRADIOL: CPT | Performed by: STUDENT IN AN ORGANIZED HEALTH CARE EDUCATION/TRAINING PROGRAM

## 2023-07-06 ENCOUNTER — PATIENT MESSAGE (OUTPATIENT)
Dept: OBSTETRICS AND GYNECOLOGY | Facility: CLINIC | Age: 28
End: 2023-07-06
Payer: COMMERCIAL

## 2023-07-06 DIAGNOSIS — Z31.41 FERTILITY TESTING: Primary | ICD-10-CM

## 2023-07-11 ENCOUNTER — HOSPITAL ENCOUNTER (OUTPATIENT)
Dept: RADIOLOGY | Facility: OTHER | Age: 28
Discharge: HOME | End: 2023-07-11
Attending: STUDENT IN AN ORGANIZED HEALTH CARE EDUCATION/TRAINING PROGRAM
Payer: COMMERCIAL

## 2023-07-11 DIAGNOSIS — Z31.41 FERTILITY TESTING: ICD-10-CM

## 2023-07-11 PROCEDURE — 76856 US EXAM PELVIC COMPLETE: CPT | Mod: TC

## 2023-07-11 PROCEDURE — 76856 US PELVIS COMP WITH TRANSVAG NON-OB (XPD): ICD-10-PCS | Mod: 26,,, | Performed by: RADIOLOGY

## 2023-07-11 PROCEDURE — 76856 US EXAM PELVIC COMPLETE: CPT | Mod: 26,,, | Performed by: RADIOLOGY

## 2023-07-11 PROCEDURE — 76830 TRANSVAGINAL US NON-OB: CPT | Mod: 26,,, | Performed by: RADIOLOGY

## 2023-07-11 PROCEDURE — 76830 US PELVIS COMP WITH TRANSVAG NON-OB (XPD): ICD-10-PCS | Mod: 26,,, | Performed by: RADIOLOGY

## 2024-01-20 ENCOUNTER — PATIENT MESSAGE (OUTPATIENT)
Dept: OBSTETRICS AND GYNECOLOGY | Facility: CLINIC | Age: 29
End: 2024-01-20
Payer: COMMERCIAL

## 2024-01-20 DIAGNOSIS — Z32.01 POSITIVE URINE PREGNANCY TEST: Primary | ICD-10-CM

## 2024-01-22 ENCOUNTER — TELEPHONE (OUTPATIENT)
Dept: OBSTETRICS AND GYNECOLOGY | Facility: CLINIC | Age: 29
End: 2024-01-22
Payer: COMMERCIAL

## 2024-01-22 NOTE — TELEPHONE ENCOUNTER
I called pt to schedule new pregnancy conf. I scheduled pt for Tuesday february 20, 2024 @ 9;00 am. Pt agreed with date and time.

## 2024-02-26 ENCOUNTER — PATIENT MESSAGE (OUTPATIENT)
Dept: OBSTETRICS AND GYNECOLOGY | Facility: CLINIC | Age: 29
End: 2024-02-26
Payer: COMMERCIAL

## 2024-02-26 DIAGNOSIS — O36.80X0 PREGNANCY WITH UNCERTAIN FETAL VIABILITY, SINGLE OR UNSPECIFIED FETUS: Primary | ICD-10-CM

## 2024-03-25 ENCOUNTER — HOSPITAL ENCOUNTER (OUTPATIENT)
Dept: PERINATAL CARE | Facility: OTHER | Age: 29
Discharge: HOME | End: 2024-03-25
Payer: COMMERCIAL

## 2024-03-25 ENCOUNTER — OFFICE VISIT (OUTPATIENT)
Dept: OBSTETRICS AND GYNECOLOGY | Facility: CLINIC | Age: 29
End: 2024-03-25
Payer: COMMERCIAL

## 2024-03-25 VITALS
DIASTOLIC BLOOD PRESSURE: 73 MMHG | WEIGHT: 152.88 LBS | HEART RATE: 83 BPM | SYSTOLIC BLOOD PRESSURE: 113 MMHG | BODY MASS INDEX: 25.44 KG/M2

## 2024-03-25 DIAGNOSIS — Z34.80 MULTIGRAVIDA, ANTEPARTUM: Primary | ICD-10-CM

## 2024-03-25 DIAGNOSIS — O36.80X0 PREGNANCY WITH UNCERTAIN FETAL VIABILITY, SINGLE OR UNSPECIFIED FETUS: ICD-10-CM

## 2024-03-25 DIAGNOSIS — N91.2 AMENORRHEA: ICD-10-CM

## 2024-03-25 PROBLEM — Z91.89 AT RISK FOR BREASTFEEDING DIFFICULTY: Status: RESOLVED | Noted: 2022-10-05 | Resolved: 2024-03-25

## 2024-03-25 PROCEDURE — 3078F DIAST BP <80 MM HG: CPT | Mod: CPTII,S$GLB,, | Performed by: ADVANCED PRACTICE MIDWIFE

## 2024-03-25 PROCEDURE — 87491 CHLMYD TRACH DNA AMP PROBE: CPT | Performed by: ADVANCED PRACTICE MIDWIFE

## 2024-03-25 PROCEDURE — 99999 PR PBB SHADOW E&M-EST. PATIENT-LVL III: CPT | Mod: PBBFAC,,, | Performed by: ADVANCED PRACTICE MIDWIFE

## 2024-03-25 PROCEDURE — 76801 OB US < 14 WKS SINGLE FETUS: CPT | Mod: 26,,, | Performed by: OBSTETRICS & GYNECOLOGY

## 2024-03-25 PROCEDURE — 3074F SYST BP LT 130 MM HG: CPT | Mod: CPTII,S$GLB,, | Performed by: ADVANCED PRACTICE MIDWIFE

## 2024-03-25 PROCEDURE — 3008F BODY MASS INDEX DOCD: CPT | Mod: CPTII,S$GLB,, | Performed by: ADVANCED PRACTICE MIDWIFE

## 2024-03-25 PROCEDURE — 87086 URINE CULTURE/COLONY COUNT: CPT | Performed by: ADVANCED PRACTICE MIDWIFE

## 2024-03-25 PROCEDURE — 1159F MED LIST DOCD IN RCRD: CPT | Mod: CPTII,S$GLB,, | Performed by: ADVANCED PRACTICE MIDWIFE

## 2024-03-25 PROCEDURE — 76801 OB US < 14 WKS SINGLE FETUS: CPT

## 2024-03-25 PROCEDURE — 99214 OFFICE O/P EST MOD 30 MIN: CPT | Mod: S$GLB,,, | Performed by: ADVANCED PRACTICE MIDWIFE

## 2024-03-25 NOTE — PROGRESS NOTES
Adina Gregory is a 28 y.o. , presents today for amenorrhea.      C/C: amenorrhea  She has not seen any other provider for this pregnancy    HPI: Reports amenorrhea since Patient's last menstrual period was 2024 (exact date).. Prior to LMP, menses were  regular occuring every 28-30 days prior to this.  She is not currently on any contraception. + UPT on 2024. Also reports nausea without vomiting. Has noticed breast tenderness. Denies vaginal bleeding since LMP.    SOCIAL HISTORY: Denies emotional/mental/physical/sexual violence or abuse. Feels safe at home. Denies guns in home.     PAP HISTORY: last pap 10/2022, result NILM with negative HPV, denies any history of abnormal pap smear or STDs.     Reports no long-term chronic medical conditions     Review of patient's allergies indicates:  No Known Allergies  Past Medical History:   Diagnosis Date    Abnormal Pap smear of cervix     required repeat pap and normal, +HPV (garsasil vaccinated)    Anxiety disorder, unspecified     seeing therapist     History reviewed. No pertinent surgical history.  History reviewed. No pertinent surgical history.  OB History    Para Term  AB Living   2       1     SAB IAB Ectopic Multiple Live Births   1              # Outcome Date GA Lbr Mitchell/2nd Weight Sex Delivery Anes PTL Lv   2 Current            1 SAB              OB History          2    Para        Term                AB   1    Living             SAB   1    IAB        Ectopic        Multiple        Live Births                   Social History     Socioeconomic History    Marital status:    Tobacco Use    Smoking status: Never    Smokeless tobacco: Never   Substance and Sexual Activity    Alcohol use: Not Currently    Drug use: Never    Sexual activity: Yes     Partners: Male     Birth control/protection: None     Comment: Jorge   Social History Narrative    Jorge-, 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      Family History   Problem Relation Age of Onset    Breast cancer Paternal Grandmother 70    Diabetes type II Father         on insulin    Diabetes type II Mother         orals    Seizures Mother     Colon cancer Maternal Aunt     Ovarian cancer Neg Hx      Social History     Substance and Sexual Activity   Sexual Activity Yes    Partners: Male    Birth control/protection: None    Comment: Jorge       GENETIC SCREENING       Comments/counseling: reports some familial heart defect that primarily affects men on her maternal side  Denies all others    INFECTION HISTORY  Live with someone with TB or exposed to TB: no  Patient or partner has history of genital herpes: denies  Rash or viral illness since last menstrual period: no  Patient or partner has hepatitis B or C: no  History of STD, gonorrhea, chlamydia, HPV, HIV, syphilis (list all that apply): HPV, chlamydia past  List other infections: none  Additional comments:    No, Primary Doctor     ROS:   Constitutional/Gen: Denies fevers, chills, malaise, or weight loss. Endorses fatigue   Psych: Denies depression, anxiety  Eyes: Denies changes in vision or scotomata  Ears, nose, mouth, throat: Denies sinus tenderness, swelling, or dentition problems  CV/vasc: Denies heart palpitations or edema  Resp: Denies SOB or dyspnea  Breasts: Denies mass, nipple discharge, or trauma. Endorses breast tenderness.  GI: Denies constipation, diarrhea, or vomiting. Endorse nausea.  : Denies vaginal discharge, dysuria or pelvic pain. Endorse urinary frequency  MS: Denies weakness, soreness, or changes in ROM    OBJECTIVE:  /73   Pulse 83   Wt 69.3 kg (152 lb 14.2 oz)   LMP 01/25/2024 (Exact Date)   BMI 25.44 kg/m²   Constitutional/Gen: NAD, appears stated age, well groomed  Neck: supple, no masses or enlargement  Head: normocephalic  Skin: warm and dry  w/o rash  Lung: normal resp effort, CTAB  Heart: normal HR, RRR   Back: negative CVAT  Breasts: bilaterally--no masses, tenderness, skin changes, or nipple discharge noted  Abdomen: soft, nontender, no masses, and bowel sounds normal, no enlargement  External genitalia: no lesions or discharge, normal hair distribution  Urethral meatus: normal size and location, no lesions or prolapse  Vagina: normal appearance, no lesions, no discharge, no evidence cystocele or rectocele.  Cervix: normal appearance, no discharge, no lesions, negative CMT  Uterus: nontender, mobile, approx 8 week size, contour, and position.   Adnexa: no masses or tenderness  Anus/Perineum: normal appearance, with no lesions or discharge. Internal exam deferred.  Extremities: FROM, with no edema or tenderness.  Neurologic: A&O x 4, non-focal, cranial nerves 2-12 grossly intact  Psych: affect appropriate and without signs of mood, thought or memory difficulty appreciated      UPT + in office    ASSESSMENT:  28 y.o. female  with amenorrhea  Likely at 8w4d via LMP; Sconsistent withD  Body mass index is 25.44 kg/m².  Patient Active Problem List   Diagnosis    Elevated alanine aminotransferase (ALT) level    At risk for breastfeeding difficulty    Pescetarian Diet (2017), vegatarian from [id number])    Hx of abnormal cervical Pap smear    Irregular menstrual cycle    Positive urine pregnancy test       PLAN:  1. Amenorrhea  -- + UPT in office, Patient's last menstrual period was 2024 (exact date). --> Estimated Date of Delivery: 10/31/2024.  -- Dating US to follow today  -- Anatomical US pending confirmation of dates and viability  -- Routine serum and urine prenatal labs today    2. Physical exam today. Discussed ASCCP guidelines. Last pap . Pap not done today/ next pap due not later than .     3. BMI 25  -- Discussed IOM recommended weight gain of:   Weight category Pre pre BMI  Recommended TWG   Underweight Less than  18.5 28-40    Normal Weight 18.5-24.9  25-35    Overweight 25-29.9  15-25    Obese   30 and greater  11-20   -- Discussed criteria for delivery at [facility] r/t excessive pre-preg weight or excessive weight gain:   Pre-pregnancy BMI over 40 or excess pregnancy weight gain defined as:   Pre-preg BMI < 18.5; Excess weight gain = > 60 pound   Pre-preg BMI 18.5-24.9;  Excess weight gain = > 53 pounds   Pre-preg BMI 25-29.9;  Excess weight gain = > 38 pounds   Pre-preg BMI > 30;  Excess weight gain = > 30 pounds    4. Discussed nausea and vomiting in pregnancy  -- Education regarding lifestyle and dietary modifications  -- Reviewed use of B6/Unisom prn. Pt will notify us if no relief/worsening symptoms, will consider alternative therapies prn    5. Pregnancy education and couseling; handouts and booklet provided    --taking prenatal vitamin [employer] Baby and Me  -- Oriented to practice and anticipated prenatal course of care and how to contact us  -- Reviewed [facility] guidelines and admission, exclusion, and transfer criteria  -- Precautions/warning signs reviewed  -- Common complaints of pregnancy  -- Routine prenatal labs including HIV  -- Ultrasounds  -- Childbirth education/hospital/[facility] facilities  -- Nutrition, prepregnant BMI, and recommended weight gain  -- Toxoplasmosis precautions (Cats/Raw Meat)  -- Sexual activity and exercise  -- Environmental/Work hazards  -- Travel  -- Tobacco (Ask, Advise, Assess, Assist, and Arrange), as well as alcohol and drug use  -- Use of any medications (Including supplements, Vitamins, Herbs, or OTC Drugs)  -- Domestic violence screen negative    6. Reviewed genetic testing options. Reviewed available first trimester and/or second  trimester screening options. Reviewed risk of false positive/negative results and recommendation of referral to [facility] in event of a positive result, for NIPT, US, and/or amniocentesis. Reviewed the possible estimated 1 in 300/500 risk of miscarriage with  amniocentesis, even with a healthy fetus. Patient desires genetic screening. [id number] to be ordered pending confirmation of dates and viability.     7.   Patient Active Problem List   Diagnosis    Elevated alanine aminotransferase (ALT) level    Pescetarian Diet (2017), vegatarian from [id number])    Hx of abnormal cervical Pap smear    Irregular menstrual cycle    Positive urine pregnancy test    Pregnancy with uncertain fetal viability      (problems)    8. Reviewed warning signs, precautions, and how/when to contact us.     9. RTC x 4 weeks, call or present sooner prn.     Updated Medication List:  Current Outpatient Medications   Medication Sig Dispense Refill    ondansetron (ZOFRAN-ODT) 4 MG TbDL Take 1 tablet (4 mg total) by mouth every 8 (eight) hours as needed (nausea). (Patient not taking: Reported on 3/25/2024) 15 tablet 0     No current facility-administered medications for this visit.       Betty Amezquita  3/25/2024  1:22 PM

## 2024-03-26 LAB
BACTERIA UR CULT: NORMAL
C TRACH DNA SPEC QL NAA+PROBE: NOT DETECTED
N GONORRHOEA DNA SPEC QL NAA+PROBE: NOT DETECTED

## 2024-03-27 ENCOUNTER — PATIENT MESSAGE (OUTPATIENT)
Dept: OBSTETRICS AND GYNECOLOGY | Facility: CLINIC | Age: 29
End: 2024-03-27
Payer: COMMERCIAL

## 2024-03-28 PROBLEM — N92.6 IRREGULAR MENSTRUAL CYCLE: Status: RESOLVED | Noted: 2023-03-09 | Resolved: 2024-03-28

## 2024-04-23 ENCOUNTER — LAB VISIT (OUTPATIENT)
Dept: LAB | Facility: OTHER | Age: 29
End: 2024-04-23
Attending: ADVANCED PRACTICE MIDWIFE
Payer: COMMERCIAL

## 2024-04-23 DIAGNOSIS — O36.1910 MATERNAL ATYPICAL ANTIBODY AFFECTING PREGNANCY IN FIRST TRIMESTER, SINGLE OR UNSPECIFIED FETUS: ICD-10-CM

## 2024-04-23 DIAGNOSIS — Z3A.12 12 WEEKS GESTATION OF PREGNANCY: ICD-10-CM

## 2024-04-23 PROBLEM — O36.80X0 PREGNANCY WITH UNCERTAIN FETAL VIABILITY: Status: RESOLVED | Noted: 2024-03-25 | Resolved: 2024-04-23

## 2024-04-23 PROBLEM — Z32.01 POSITIVE URINE PREGNANCY TEST: Status: RESOLVED | Noted: 2024-01-20 | Resolved: 2024-04-23

## 2024-04-23 LAB
ABO + RH BLD: NORMAL
BLD GP AB SCN CELLS X3 SERPL QL: NORMAL
SPECIMEN OUTDATE: NORMAL

## 2024-04-23 PROCEDURE — 86850 RBC ANTIBODY SCREEN: CPT | Performed by: ADVANCED PRACTICE MIDWIFE

## 2024-04-23 PROCEDURE — 36415 COLL VENOUS BLD VENIPUNCTURE: CPT | Performed by: ADVANCED PRACTICE MIDWIFE

## 2024-05-06 ENCOUNTER — TELEPHONE (OUTPATIENT)
Dept: OBSTETRICS AND GYNECOLOGY | Facility: CLINIC | Age: 29
End: 2024-05-06
Payer: COMMERCIAL

## 2024-05-21 ENCOUNTER — PATIENT MESSAGE (OUTPATIENT)
Dept: OTHER | Facility: OTHER | Age: 29
End: 2024-05-21
Payer: COMMERCIAL

## 2024-05-22 ENCOUNTER — LAB VISIT (OUTPATIENT)
Dept: LAB | Facility: OTHER | Age: 29
End: 2024-05-22
Attending: ADVANCED PRACTICE MIDWIFE
Payer: COMMERCIAL

## 2024-05-22 ENCOUNTER — ROUTINE PRENATAL (OUTPATIENT)
Dept: OBSTETRICS AND GYNECOLOGY | Facility: CLINIC | Age: 29
End: 2024-05-22
Payer: COMMERCIAL

## 2024-05-22 ENCOUNTER — PATIENT MESSAGE (OUTPATIENT)
Dept: OBSTETRICS AND GYNECOLOGY | Facility: CLINIC | Age: 29
End: 2024-05-22

## 2024-05-22 VITALS
SYSTOLIC BLOOD PRESSURE: 104 MMHG | DIASTOLIC BLOOD PRESSURE: 63 MMHG | WEIGHT: 155.88 LBS | HEART RATE: 85 BPM | BODY MASS INDEX: 25.94 KG/M2

## 2024-05-22 DIAGNOSIS — Z3A.16 16 WEEKS GESTATION OF PREGNANCY: Primary | ICD-10-CM

## 2024-05-22 DIAGNOSIS — Z3A.12 12 WEEKS GESTATION OF PREGNANCY: ICD-10-CM

## 2024-05-22 DIAGNOSIS — R74.01 ELEVATED ALANINE AMINOTRANSFERASE (ALT) LEVEL: ICD-10-CM

## 2024-05-22 LAB
ALBUMIN SERPL BCP-MCNC: 3.6 G/DL (ref 3.5–5.2)
ALP SERPL-CCNC: 48 U/L (ref 55–135)
ALT SERPL W/O P-5'-P-CCNC: 17 U/L (ref 10–44)
ANION GAP SERPL CALC-SCNC: 6 MMOL/L (ref 8–16)
AST SERPL-CCNC: 19 U/L (ref 10–40)
BILIRUB SERPL-MCNC: 0.4 MG/DL (ref 0.1–1)
BUN SERPL-MCNC: 6 MG/DL (ref 6–20)
CALCIUM SERPL-MCNC: 8.9 MG/DL (ref 8.7–10.5)
CHLORIDE SERPL-SCNC: 108 MMOL/L (ref 95–110)
CO2 SERPL-SCNC: 22 MMOL/L (ref 23–29)
CREAT SERPL-MCNC: 0.6 MG/DL (ref 0.5–1.4)
EST. GFR  (NO RACE VARIABLE): >60 ML/MIN/1.73 M^2
GLUCOSE SERPL-MCNC: 78 MG/DL (ref 70–110)
POTASSIUM SERPL-SCNC: 4.1 MMOL/L (ref 3.5–5.1)
PROT SERPL-MCNC: 6.5 G/DL (ref 6–8.4)
SODIUM SERPL-SCNC: 136 MMOL/L (ref 136–145)

## 2024-05-22 PROCEDURE — 99999 PR PBB SHADOW E&M-EST. PATIENT-LVL III: CPT | Mod: PBBFAC,,, | Performed by: ADVANCED PRACTICE MIDWIFE

## 2024-05-22 PROCEDURE — 82105 ALPHA-FETOPROTEIN SERUM: CPT | Performed by: ADVANCED PRACTICE MIDWIFE

## 2024-05-22 PROCEDURE — 0502F SUBSEQUENT PRENATAL CARE: CPT | Mod: CPTII,S$GLB,, | Performed by: ADVANCED PRACTICE MIDWIFE

## 2024-05-22 PROCEDURE — 36415 COLL VENOUS BLD VENIPUNCTURE: CPT | Performed by: ADVANCED PRACTICE MIDWIFE

## 2024-05-22 PROCEDURE — 80053 COMPREHEN METABOLIC PANEL: CPT | Performed by: ADVANCED PRACTICE MIDWIFE

## 2024-05-22 RX ORDER — ASPIRIN 81 MG/1
81 TABLET ORAL DAILY
Qty: 90 TABLET | Refills: 1 | Status: SHIPPED | OUTPATIENT
Start: 2024-05-22 | End: 2024-08-07

## 2024-05-22 NOTE — PROGRESS NOTES
Routine Prenatal Visit    29 y.o.,  at 16w1d     Complaints today: None.  Doing well without concerns.  TW lbs   Denies cramping/contractions, denies leaking vaginal fluid, denies vaginal bleeding.   Reports has not yet felt fetal movement.    Allergies: Patient has no known allergies.    PHYSICAL EXAM  GENERAL: No acute distress  HEENT: Normocephalic, atruamatic  NEURO: Alert and oriented x3  PSYCH: Calm, normal mood and affect  PULMONARY: Non-labored respiration; no tachypnea  ABD: Soft, gravid, nontender  FH = midway between pubic symphysis & umbilicus    ASSESSMENT AND PLAN  16 weeks gestation of pregnancy  -     Aspirin (ECOTRIN) 81 MG EC tablet; Take 1 tablet (81 mg total) by mouth once daily. (Patient not taking: Reported on 2024)  Dispense: 90 tablet; Refill: 1    Pt offered carrier screening - she has declined and continues to not feel need for it.  Does not meet traditional risk criteria to start Aspirin 81mg nightly - however, has twin half brothers with significant clotting disorders that are being worked up and so desires to start ASA.  Rx for ASA sent to pt's pharmacy - discussed appropriate use, along with risks and benefits  Reviewed warning signs and pregnancy precautions, along with how/when to call.  Anatomy ultrasound with MFM ordered/discussed - has been scheduled.  Reviewed aneuploidy screening:   Pt has completed [id number] - NEGATIVE. Education regarding AFP screening today and pt desires - will do today.    Birth Center Risk Assessment: 0- Meets birth center guidelines    CNM management in ABC  CNM management on L&D  Consultation with OB to develop  plan of care  Collaborative CNM/OB management with delivery on L&D  Permanent referral of care to MD      Follow up: approx 4 wks, call or present sooner prn.     Beatriz Reagan, ELVIN, APRN

## 2024-05-28 ENCOUNTER — PATIENT MESSAGE (OUTPATIENT)
Dept: OTHER | Facility: OTHER | Age: 29
End: 2024-05-28
Payer: COMMERCIAL

## 2024-05-28 LAB
# FETUSES US: NORMAL
AFP INTERPRETATION: NORMAL
AFP MOM SERPL: 1.58
AFP SERPL-MCNC: 51.6 NG/ML
AFP SERPL-MCNC: NEGATIVE NG/ML
AGE AT DELIVERY: 30
GA (DAYS): 1 D
GA (WEEKS): 16 WK
GESTATIONAL AGE METHOD: NORMAL
IDDM PATIENT QL: NORMAL
SMOKING STATUS FTND: NO

## 2024-06-18 ENCOUNTER — ROUTINE PRENATAL (OUTPATIENT)
Dept: OBSTETRICS AND GYNECOLOGY | Facility: CLINIC | Age: 29
End: 2024-06-18
Payer: COMMERCIAL

## 2024-06-18 ENCOUNTER — PROCEDURE VISIT (OUTPATIENT)
Dept: MATERNAL FETAL MEDICINE | Facility: CLINIC | Age: 29
End: 2024-06-18
Payer: COMMERCIAL

## 2024-06-18 VITALS
BODY MASS INDEX: 26.82 KG/M2 | HEART RATE: 81 BPM | SYSTOLIC BLOOD PRESSURE: 101 MMHG | WEIGHT: 161.19 LBS | DIASTOLIC BLOOD PRESSURE: 62 MMHG

## 2024-06-18 DIAGNOSIS — Z3A.12 12 WEEKS GESTATION OF PREGNANCY: ICD-10-CM

## 2024-06-18 DIAGNOSIS — Z3A.20 20 WEEKS GESTATION OF PREGNANCY: Primary | ICD-10-CM

## 2024-06-18 PROCEDURE — 0502F SUBSEQUENT PRENATAL CARE: CPT | Mod: CPTII,S$GLB,,

## 2024-06-18 PROCEDURE — 76805 OB US >/= 14 WKS SNGL FETUS: CPT | Mod: S$GLB,,, | Performed by: OBSTETRICS & GYNECOLOGY

## 2024-06-18 PROCEDURE — 99999 PR PBB SHADOW E&M-EST. PATIENT-LVL III: CPT | Mod: PBBFAC,,,

## 2024-06-18 NOTE — PROGRESS NOTES
Reason for Visit   Routine Prenatal Visit    28 y.o.,  at 20w0d    Complaints today: none. Possible yeast infection but reports that her symptoms are improving. Doing well without concerns. Anatomy scan immediately after visit today.     Denies contractions, vaginal bleeding, leaking amniotic fluid.  Reports active fetal movement    TW lbs   Allergies: Patient has no known allergies.    PHYSICAL EXAM  GENERAL: No acute distress  HEENT: Normocephalic, atruamatic  NEURO: Alert and oriented x3  PSYCH: Calm, normal mood and affect  PULMONARY: Non-labored respiration; no tachypnea  ABD: Soft, gravid, nontender  FH = umbilicus    ASSESSMENT AND PLAN  20 weeks gestation of pregnancy        Anatomy ultrasound today immediately after visit  Discussed OTC treatment for yeast infection if she feels symptoms again.   Reviewed exercise/diet recommendations in pregnancy.  Encouraged prenatal education classes - schedule given.  Education regarding  labor warning s/s.  Reviewed warning signs, normal FM, and how/when to call.  Confirmed after-hours number given to patient.    Follow-up: 4 weeks, call or present sooner CHINMAY Patel CNM, APRN

## 2024-07-16 ENCOUNTER — PATIENT MESSAGE (OUTPATIENT)
Dept: OTHER | Facility: OTHER | Age: 29
End: 2024-07-16
Payer: COMMERCIAL

## 2024-07-16 ENCOUNTER — ROUTINE PRENATAL (OUTPATIENT)
Dept: OBSTETRICS AND GYNECOLOGY | Facility: CLINIC | Age: 29
End: 2024-07-16
Payer: COMMERCIAL

## 2024-07-16 VITALS
HEART RATE: 87 BPM | WEIGHT: 168.44 LBS | BODY MASS INDEX: 28.03 KG/M2 | DIASTOLIC BLOOD PRESSURE: 70 MMHG | SYSTOLIC BLOOD PRESSURE: 110 MMHG

## 2024-07-16 DIAGNOSIS — Z3A.24 24 WEEKS GESTATION OF PREGNANCY: Primary | ICD-10-CM

## 2024-07-16 PROCEDURE — 99999 PR PBB SHADOW E&M-EST. PATIENT-LVL III: CPT | Mod: PBBFAC,,,

## 2024-07-16 PROCEDURE — 0502F SUBSEQUENT PRENATAL CARE: CPT | Mod: CPTII,S$GLB,,

## 2024-07-16 NOTE — LETTER
July 16, 2024    Adina Jenkins  [address] [facility] [address]              [facility]  [address]  [facility] [place] [facility] [address]  Phone: [phone]  Fax: [phone]      To Whom It May Concern:    Ms. Jenkins (Candidate ID [id number]) is currently under our care for pregnancy.  Estimated Date of Delivery: 11/5/2024  She is scheduled for jury duty in August.     These are general guidelines for pregnant women:      No lifting / pulling / pushing more than 20 pounds.   Take 15 minute break every 3 hours.  Avoid repetitive bending down at the waist.   Allow to carry snacks and water.      Sincerely,         Za Patel CNM

## 2024-07-16 NOTE — PROGRESS NOTES
Routine Prenatal Visit    28 y.o. female  at 24w0d, by Estimated Date of Delivery: 24    Complaints today: none. Doing well today.   Denies UCs, LOF, or VB. Reports daily FM.    Reviewed TW lbs    PHYSICAL EXAM  /70   Pulse 87   Wt 76.4 kg (168 lb 6.9 oz)   LMP 2024 (Exact Date)   BMI 28.03 kg/m²     GENERAL: No acute distress  HEENT: Normocephalic, atraumatic  NEURO: Alert and oriented x3  PSYCH: Normal mood and affect  PULMONARY: Non-labored respiration; no tachypnea  ABD: Soft, gravid, nontender    FH 25        ASSESSMENT AND PLAN  24 weeks gestation of pregnancy      Reviewed upcoming 28wk labs, (AB POS) and orders placed.  Education regarding warning signs of PreEclampsia, reviewed normal FM,  labor precautions, and how/when to call.      Follow-up: 4 weeks, call or present sooner PRN    EMEKA Wing

## 2024-07-16 NOTE — PROGRESS NOTES
I have seen the patient, reviewed the Student Nurse-Midwife's assessment and plan. I have personally interviewed and examined the patient at bedside and: agree with the findings.  Za Patel CNM

## 2024-07-23 ENCOUNTER — PATIENT MESSAGE (OUTPATIENT)
Dept: OBSTETRICS AND GYNECOLOGY | Facility: CLINIC | Age: 29
End: 2024-07-23
Payer: COMMERCIAL

## 2024-07-30 ENCOUNTER — PATIENT MESSAGE (OUTPATIENT)
Dept: OTHER | Facility: OTHER | Age: 29
End: 2024-07-30
Payer: COMMERCIAL

## 2024-08-07 ENCOUNTER — PATIENT MESSAGE (OUTPATIENT)
Dept: OBSTETRICS AND GYNECOLOGY | Facility: CLINIC | Age: 29
End: 2024-08-07
Payer: COMMERCIAL

## 2024-08-07 ENCOUNTER — OFFICE VISIT (OUTPATIENT)
Dept: URGENT CARE | Facility: CLINIC | Age: 29
End: 2024-08-07
Payer: COMMERCIAL

## 2024-08-07 VITALS
DIASTOLIC BLOOD PRESSURE: 70 MMHG | SYSTOLIC BLOOD PRESSURE: 103 MMHG | WEIGHT: 168 LBS | HEART RATE: 91 BPM | OXYGEN SATURATION: 99 % | TEMPERATURE: 99 F | RESPIRATION RATE: 16 BRPM | HEIGHT: 65 IN | BODY MASS INDEX: 27.99 KG/M2

## 2024-08-07 DIAGNOSIS — U07.1 COVID-19: Primary | ICD-10-CM

## 2024-08-07 DIAGNOSIS — Z3A.27 27 WEEKS GESTATION OF PREGNANCY: ICD-10-CM

## 2024-08-07 DIAGNOSIS — R05.9 COUGH, UNSPECIFIED TYPE: ICD-10-CM

## 2024-08-07 PROBLEM — G43.909 MIGRAINE: Status: ACTIVE | Noted: 2024-08-07

## 2024-08-07 PROBLEM — N20.0 KIDNEY STONE: Status: ACTIVE | Noted: 2024-08-07

## 2024-08-07 LAB
CTP QC/QA: YES
SARS-COV-2 AG RESP QL IA.RAPID: POSITIVE

## 2024-08-07 PROCEDURE — 99214 OFFICE O/P EST MOD 30 MIN: CPT | Mod: ,,, | Performed by: FAMILY MEDICINE

## 2024-08-07 PROCEDURE — 87811 SARS-COV-2 COVID19 W/OPTIC: CPT | Mod: QW,,, | Performed by: FAMILY MEDICINE

## 2024-08-07 RX ORDER — NIRMATRELVIR AND RITONAVIR 300-100 MG
KIT ORAL
Qty: 30 TABLET | Refills: 0 | Status: SHIPPED | OUTPATIENT
Start: 2024-08-07 | End: 2024-08-12

## 2024-08-13 ENCOUNTER — PATIENT MESSAGE (OUTPATIENT)
Dept: OTHER | Facility: OTHER | Age: 29
End: 2024-08-13
Payer: COMMERCIAL

## 2024-08-14 ENCOUNTER — ROUTINE PRENATAL (OUTPATIENT)
Dept: OBSTETRICS AND GYNECOLOGY | Facility: CLINIC | Age: 29
End: 2024-08-14
Payer: COMMERCIAL

## 2024-08-14 ENCOUNTER — LAB VISIT (OUTPATIENT)
Dept: LAB | Facility: OTHER | Age: 29
End: 2024-08-14
Payer: COMMERCIAL

## 2024-08-14 VITALS
SYSTOLIC BLOOD PRESSURE: 110 MMHG | DIASTOLIC BLOOD PRESSURE: 68 MMHG | BODY MASS INDEX: 29.35 KG/M2 | HEART RATE: 91 BPM | WEIGHT: 176.38 LBS

## 2024-08-14 DIAGNOSIS — Z3A.28 28 WEEKS GESTATION OF PREGNANCY: Primary | ICD-10-CM

## 2024-08-14 DIAGNOSIS — R73.09 ABNORMAL GLUCOSE TOLERANCE TEST (GTT): Primary | ICD-10-CM

## 2024-08-14 DIAGNOSIS — Z3A.24 24 WEEKS GESTATION OF PREGNANCY: ICD-10-CM

## 2024-08-14 LAB
BASOPHILS # BLD AUTO: 0.02 K/UL (ref 0–0.2)
BASOPHILS NFR BLD: 0.2 % (ref 0–1.9)
DIFFERENTIAL METHOD BLD: ABNORMAL
EOSINOPHIL # BLD AUTO: 0.1 K/UL (ref 0–0.5)
EOSINOPHIL NFR BLD: 0.6 % (ref 0–8)
ERYTHROCYTE [DISTWIDTH] IN BLOOD BY AUTOMATED COUNT: 12.7 % (ref 11.5–14.5)
GLUCOSE SERPL-MCNC: 162 MG/DL (ref 70–140)
HCT VFR BLD AUTO: 33.6 % (ref 37–48.5)
HGB BLD-MCNC: 11.2 G/DL (ref 12–16)
IMM GRANULOCYTES # BLD AUTO: 0.07 K/UL (ref 0–0.04)
IMM GRANULOCYTES NFR BLD AUTO: 0.7 % (ref 0–0.5)
LYMPHOCYTES # BLD AUTO: 1.4 K/UL (ref 1–4.8)
LYMPHOCYTES NFR BLD: 14.5 % (ref 18–48)
MCH RBC QN AUTO: 32 PG (ref 27–31)
MCHC RBC AUTO-ENTMCNC: 33.3 G/DL (ref 32–36)
MCV RBC AUTO: 96 FL (ref 82–98)
MONOCYTES # BLD AUTO: 0.5 K/UL (ref 0.3–1)
MONOCYTES NFR BLD: 5.6 % (ref 4–15)
NEUTROPHILS # BLD AUTO: 7.6 K/UL (ref 1.8–7.7)
NEUTROPHILS NFR BLD: 78.4 % (ref 38–73)
NRBC BLD-RTO: 0 /100 WBC
PLATELET # BLD AUTO: 152 K/UL (ref 150–450)
PMV BLD AUTO: 10.5 FL (ref 9.2–12.9)
RBC # BLD AUTO: 3.5 M/UL (ref 4–5.4)
WBC # BLD AUTO: 9.66 K/UL (ref 3.9–12.7)

## 2024-08-14 PROCEDURE — 99999 PR PBB SHADOW E&M-EST. PATIENT-LVL III: CPT | Mod: PBBFAC,,,

## 2024-08-14 PROCEDURE — 36415 COLL VENOUS BLD VENIPUNCTURE: CPT

## 2024-08-14 PROCEDURE — 85025 COMPLETE CBC W/AUTO DIFF WBC: CPT

## 2024-08-14 PROCEDURE — 82950 GLUCOSE TEST: CPT

## 2024-08-22 ENCOUNTER — TELEPHONE (OUTPATIENT)
Dept: OBSTETRICS AND GYNECOLOGY | Facility: HOSPITAL | Age: 29
End: 2024-08-22
Payer: COMMERCIAL

## 2024-08-22 ENCOUNTER — LAB VISIT (OUTPATIENT)
Dept: LAB | Facility: OTHER | Age: 29
End: 2024-08-22
Attending: ADVANCED PRACTICE MIDWIFE
Payer: COMMERCIAL

## 2024-08-22 ENCOUNTER — PATIENT MESSAGE (OUTPATIENT)
Dept: MATERNAL FETAL MEDICINE | Facility: CLINIC | Age: 29
End: 2024-08-22
Payer: COMMERCIAL

## 2024-08-22 DIAGNOSIS — R73.09 ABNORMAL GLUCOSE TOLERANCE TEST (GTT): ICD-10-CM

## 2024-08-22 DIAGNOSIS — O24.419 GESTATIONAL DIABETES MELLITUS (GDM) IN THIRD TRIMESTER, GESTATIONAL DIABETES METHOD OF CONTROL UNSPECIFIED: Primary | ICD-10-CM

## 2024-08-22 LAB
GLUCOSE SERPL-MCNC: 151 MG/DL
GLUCOSE SERPL-MCNC: 204 MG/DL
GLUCOSE SERPL-MCNC: 215 MG/DL
GLUCOSE SERPL-MCNC: 75 MG/DL (ref 70–110)

## 2024-08-22 PROCEDURE — 36415 COLL VENOUS BLD VENIPUNCTURE: CPT | Performed by: ADVANCED PRACTICE MIDWIFE

## 2024-08-22 PROCEDURE — 82952 GTT-ADDED SAMPLES: CPT | Performed by: ADVANCED PRACTICE MIDWIFE

## 2024-08-22 PROCEDURE — 82951 GLUCOSE TOLERANCE TEST (GTT): CPT | Performed by: ADVANCED PRACTICE MIDWIFE

## 2024-08-22 RX ORDER — LANCETS
EACH MISCELLANEOUS
Qty: 200 EACH | Refills: 1 | Status: SHIPPED | OUTPATIENT
Start: 2024-08-22

## 2024-08-22 RX ORDER — INSULIN PUMP SYRINGE, 3 ML
EACH MISCELLANEOUS
Qty: 1 EACH | Refills: 0 | Status: SHIPPED | OUTPATIENT
Start: 2024-08-22 | End: 2025-08-22

## 2024-08-22 NOTE — TELEPHONE ENCOUNTER
Called pt and reviewed 3hr GTT, which she did not pass. Discussed new diagnosis of gestational diabetes and increased surveillance related to this. Orders placed and all questions answered.

## 2024-08-27 ENCOUNTER — ROUTINE PRENATAL (OUTPATIENT)
Dept: OBSTETRICS AND GYNECOLOGY | Facility: CLINIC | Age: 29
End: 2024-08-27
Payer: COMMERCIAL

## 2024-08-27 ENCOUNTER — PATIENT MESSAGE (OUTPATIENT)
Dept: OTHER | Facility: OTHER | Age: 29
End: 2024-08-27
Payer: COMMERCIAL

## 2024-08-27 VITALS
SYSTOLIC BLOOD PRESSURE: 110 MMHG | DIASTOLIC BLOOD PRESSURE: 69 MMHG | WEIGHT: 175.81 LBS | BODY MASS INDEX: 29.26 KG/M2 | HEART RATE: 87 BPM

## 2024-08-27 DIAGNOSIS — O24.410 DIET CONTROLLED GESTATIONAL DIABETES MELLITUS (GDM) IN THIRD TRIMESTER: Primary | ICD-10-CM

## 2024-08-27 PROCEDURE — 99999 PR PBB SHADOW E&M-EST. PATIENT-LVL II: CPT | Mod: PBBFAC,,, | Performed by: ADVANCED PRACTICE MIDWIFE

## 2024-08-27 PROCEDURE — 0502F SUBSEQUENT PRENATAL CARE: CPT | Mod: CPTII,S$GLB,, | Performed by: ADVANCED PRACTICE MIDWIFE

## 2024-08-27 NOTE — PROGRESS NOTES
29 y.o.,  at 30w0d    Presents today for routine OB appt. Complaints today: none.  Doing ok with DM diet; awaiting MFM appointment.     TW lbs   Denies UCs, LOF, VB. Denies HA, visual disturbances, or upper abdominal pain; no nausea/vomiting. Reports active fetal movement.    PHYSICAL EXAM  GENERAL: No acute distress  HEENT: Normocephalic, atraumatic  NEURO: Alert and oriented x3  PULMONARY: Non-labored respiration; no tachypnea  ABD: Soft, gravid, nontender.      ASSESSMENT AND PLAN  Diet controlled gestational diabetes mellitus (GDM) in third trimester  -     blood sugar diagnostic (TRUE METRIX GLUCOSE TEST STRIP) Strp; 1 each by Misc.(Non-Drug; Combo Route) route 4 (four) times daily.  Dispense: 100 each; Refill: 1      Reviewed 28wk labs  Will get TDAP at pharmacy  Is  taking Childbirth Education classes.  Education regarding options for postpartum contraception, patient will consider.  Reviewed warning s/s for PreE, PTL, FMC, and discussed how/when to call.    Birth Center Risk Assessment: 1-management on labor and Delivery    CNM management in ABC  CNM management on L&D  Consultation with OB to develop  plan of care  Collaborative CNM/OB management with delivery on L&D  Permanent referral of care to MD    Reviewed that GDM risks out of ABC, but not out of midwifery care  Keep MFM appointment    Follow-up: 2 weeks, call or present sooner CHINMAY Amezquita, ELVIN, APRN

## 2024-08-28 ENCOUNTER — CLINICAL SUPPORT (OUTPATIENT)
Dept: DIABETES | Facility: CLINIC | Age: 29
End: 2024-08-28
Payer: COMMERCIAL

## 2024-08-28 DIAGNOSIS — O24.419 GESTATIONAL DIABETES MELLITUS (GDM) IN THIRD TRIMESTER, GESTATIONAL DIABETES METHOD OF CONTROL UNSPECIFIED: ICD-10-CM

## 2024-08-28 PROCEDURE — G0108 DIAB MANAGE TRN  PER INDIV: HCPCS | Mod: S$GLB,,, | Performed by: DIETITIAN, REGISTERED

## 2024-08-28 PROCEDURE — 99999 PR PBB SHADOW E&M-EST. PATIENT-LVL I: CPT | Mod: PBBFAC,,, | Performed by: DIETITIAN, REGISTERED

## 2024-08-29 NOTE — PROGRESS NOTES
Diabetes Care Specialist Progress Note  Author: Dee Carrera RD, CDE  Date: 8/29/2024    Intake    Program Intake  Reason for Diabetes Program Visit:: Initial Diabetes Assessment  Current diabetes risk level:: low  In the last 12 months, have you:: none    Current Diabetes Treatment: Diet/Exercise    Continuous Glucose Monitoring  Patient has CGM: No    Lab Results   Component Value Date    HGBA1C 4.9 06/05/2023       Lifestyle Coping Support & Clinical    Lifestyle/Coping/Support  Does anyone in your family have diabetes or does anyone in your family support you in your diabetes care?:  is supportive  Learning Barriers:: None  Culture or Adventist beliefs that may impact ability to access healthcare: No  Psychosocial/Coping Skills Assessment Completed: : No  Deffered due to:: Time  Area of need?: Deferred         Diabetes Self-Management Skills Assessment    Medication Skills Assessment  Patient is able to identify current diabetes medications, dosages, and appropriate timing of medications.:  (not currently on dm meds)  Medication Skills Assessment Completed:: Yes  Assessment indicates:: Instruction Needed  Area of need?: Yes    Diabetes Disease Process/Treatment Options  Diabetes Type?: Gestational  When were you diagnosed?: 8/22  If previous diabetes education, when/where:: n/a  What are your goals for this education session?: learn about managing GDM  Is patient aware of what causes diabetes?: Yes  Does patient understand the pathophysiology of diabetes?: Yes  Diabetes Disease Process/Treatment Options: Skills Assessment Completed: Yes  Assessment indicates:: Instruction Needed  Area of need?: Yes    Nutrition/Healthy Eating  Meal Plan 24 Hour Recall - Breakfast: magic spoon cereal, drinkable Greek yogurt, decaf coffee with milk  Meal Plan 24 Hour Recall - Lunch: egg sandwich with 1/2 bread, salad  Meal Plan 24 Hour Recall - Dinner: salmon, baked sweet potato, roasted veg  Meal Plan 24 Hour Recall -  Snack: cheese stick, nuts, fruit  Meal Plan 24 Hour Recall - Beverage: water  Who shops/cooks?: self  Patient can identify foods that impact blood sugar.: yes  Challenges to healthy eating:: other (see comments) (doesn't know how many carbs are okay)  Nutrition/Healthy Eating Skills Assessment Completed:: Yes  Assessment indicates:: Instruction Needed  Area of need?: Yes    Physical Activity/Exercise  Patient's daily activity level:: moderately active  Patient formally exercises outside of work.: yes  Frequency: four or more times a week  Patient can identify forms of physical activity.: yes  Physical Activity/Exercise Skills Assessment Completed: : Yes  Assessment indicates:: Instruction Needed  Area of need?: Yes    Home Blood Glucose Monitoring  Patient states that blood sugar is checked at home daily.: yes  Monitoring Method:: home glucometer  Fasting BG range history:: mostly 80s, had 1 low (58) and occaisionally gets into 90s  Postmeal BG range history:: majority , had one in 160s  Home Blood Glucose Monitoring Skills Assessment Completed: : Yes  Assessment indicates:: Instruction Needed  Area of need?: Yes    Acute Complications  Have you ever had hypoglycemia (low BG 70 or less)?: yes  How often and what are your symptoms?: rarely, gets shakey, clammy  How do you treat hypoglycemia?: eat something  Have you ever had hyperglycemia (high  or more)?: no  Acute Complications Skills Assessment Completed: : Yes  Assessment indicates:: Instruction Needed  Area of need?: Yes    Chronic Complications  Reviewed health maintenance: yes  Chronic Complications Skills Assessment Completed: : Yes  Assessment indicates:: Instruction Needed  Area of need?: Yes      Assessment Summary and Plan    Based on today's diabetes care assessment, the following areas of need were identified:          8/28/2024    12:01 AM   Areas of Need     Diabetes Disease Process/Treatment Options Yes:     Ed on what GDM is and how it  evolves throughout pregnancy    Ed on insulin resistance and cho intolerance     Ed on importance of using diet, exercise and lifestyle changes to control BG during pregnancy   Nutrition/Healthy Eating   Yes: see care planning     Physical Activity/Exercise Yes:     Ed on ADA recs for physical activity and safety considerations during pregnancy   Medication Yes:     Ed on potential meds used to control BG in pregnancy   Home Blood Glucose Monitoring   Yes: see care planning      Acute Complications Yes:     Ed on hypoglycemia:  What is considered a low BG  Ed on s/s of low BG and how to prevent and treat during pregnancy     Ed on GDM related complications and when to seek medical attention     Chronic Complications Yes:     Ed on long term complications r/t GDM, especially increased risk of developing Type II DM later on in life  Stressed importance of getting screened annually for diabetes w/ PCP         Today's interventions were provided through individual discussion, instruction, and written materials were provided.      Patient verbalized understanding of instruction and written materials.  Pt was able to return back demonstration of instructions today. Patient understood key points, needs reinforcement and further instruction.     Diabetes Self-Management Care Plan:    Today's Diabetes Self-Management Care Plan was developed with Adina's input. Adina has agreed to work toward the following goal(s) to improve his/her overall diabetes control.      Care Plan: Diabetes Management   Updates made since 7/30/2024 12:00 AM        Problem: Healthy Eating         Goal: Eat 30-45g/2-3 servings of carb at breakfast and 45-60g/3-4 servings of carb at lunch and dinner.    Start Date: 8/28/2024   Expected End Date: 9/28/2024   Priority: Medium   Barriers: No Barriers Identified   Note:    8/28/24 - Pt shows good general understanding of what foods are carb, choosing more high fiber carbs, and balancing meals and snacks.  Provided review of basic carb counting, carb portion sizes using dry measuring cups and food models for visual aid, label reading, and including lean protein and non-starchy vegetables at meal/snack. Reviewed meal planning, plate method, and went over some examples. Encouraged 30-45g carb at breakfast and 45-60g carb at lunch and dinner.        Task: Reviewed the sources and role of Carbohydrate, Protein, and Fat and how each nutrient impacts blood sugar. Completed 8/29/2024        Task: Provided visual examples using dry measuring cups, food models, and other familiar objects such as computer mouse, deck or cards, tennis ball etc. to help with visualization of portions. Completed 8/29/2024        Task: Explained how to count carbohydrates using the food label and the use of dry measuring cups for accurate carb counting. Completed 8/29/2024        Task: Discussed strategies for choosing healthier menu options when dining out. Completed 8/29/2024        Task: Review the importance of balancing carbohydrates with each meal using portion control techniques to count servings of carbohydrate and label reading to identify serving size and amount of total carbs per serving. Completed 8/29/2024        Task: Provided Sample plate method and reviewed the use of the plate to estimate amounts of carbohydrate per meal. Completed 8/29/2024        Problem: Blood Glucose Self-Monitoring         Goal: Patient agrees to check and record blood sugars 4 times per day.    Start Date: 8/28/2024   Expected End Date: 9/28/2024   Priority: High   Barriers: No Barriers Identified   Note:    8/28/24 - Pt has already started SMBG and brought log today. Reviewed log and majority of readings within goal, occasionally above goal after meals. Reviewed goal readings, continuing to test FBG and 2hr PP, bringing log to all appts and provided log sheets. Encouraged to send MD log if trend of BG outside of goal occurs.        Task: Reviewed the  importance of self-monitoring blood glucose and keeping logs. Completed 8/29/2024        Task: Provided patient with blood glucose logs, reviewed appropriate timing and frequency to SMBG, education on parameters on when to notify provider and advised patient to bring logs to all appts with PCP/Endocrinologist/Diabetes Care Specialist. Completed 8/29/2024          Follow Up Plan     Follow up with questions.    Today's care plan and follow up schedule was discussed with patient.  Adina verbalized understanding of the care plan, goals, and agrees to follow up plan.        The patient was encouraged to communicate with his/her health care provider/physician and care team regarding his/her condition(s) and treatment.  I provided the patient with my contact information today and encouraged to contact me via phone or [facility] Patient Portal as needed.     Length of Visit   Total Time: 45 Minutes

## 2024-09-09 ENCOUNTER — PATIENT MESSAGE (OUTPATIENT)
Dept: MATERNAL FETAL MEDICINE | Facility: CLINIC | Age: 29
End: 2024-09-09

## 2024-09-09 ENCOUNTER — OFFICE VISIT (OUTPATIENT)
Dept: MATERNAL FETAL MEDICINE | Facility: CLINIC | Age: 29
End: 2024-09-09
Payer: COMMERCIAL

## 2024-09-09 ENCOUNTER — PROCEDURE VISIT (OUTPATIENT)
Dept: MATERNAL FETAL MEDICINE | Facility: CLINIC | Age: 29
End: 2024-09-09
Payer: COMMERCIAL

## 2024-09-09 VITALS
SYSTOLIC BLOOD PRESSURE: 105 MMHG | DIASTOLIC BLOOD PRESSURE: 77 MMHG | HEIGHT: 65 IN | HEART RATE: 96 BPM | BODY MASS INDEX: 29.41 KG/M2 | WEIGHT: 176.5 LBS

## 2024-09-09 DIAGNOSIS — Z36.89 ENCOUNTER FOR ULTRASOUND TO CHECK FETAL GROWTH: ICD-10-CM

## 2024-09-09 DIAGNOSIS — O24.419 GESTATIONAL DIABETES MELLITUS (GDM) IN THIRD TRIMESTER, GESTATIONAL DIABETES METHOD OF CONTROL UNSPECIFIED: ICD-10-CM

## 2024-09-09 DIAGNOSIS — Z36.2 ENCOUNTER FOR FOLLOW-UP ULTRASOUND OF FETAL ANATOMY: Primary | ICD-10-CM

## 2024-09-09 PROCEDURE — 99999 PR PBB SHADOW E&M-EST. PATIENT-LVL III: CPT | Mod: PBBFAC,,, | Performed by: OBSTETRICS & GYNECOLOGY

## 2024-09-09 NOTE — PROGRESS NOTES
Maternal Fetal Medicine consult    SUBJECTIVE:     Adina Jenkins is a 29 y.o.  female with IUP at 31w6d who is seen in consultation by MFM for evaluation and management of:  Problem   Gestational Diabetes Mellitus (Gdm) in Third Trimester       OB HX:  OB History    Para Term  AB Living   2       1     SAB IAB Ectopic Multiple Live Births   1              # Outcome Date GA Lbr Mitchell/2nd Weight Sex Type Anes PTL Lv   2 Current            1 SAB 24     SAB          Past Medical History:   Diagnosis Date    Abnormal Pap smear of cervix     required repeat pap and normal, +HPV (garsasil vaccinated)    Anxiety disorder, unspecified     seeing therapist    Gestational diabetes     Irregular menstrual cycle 2023     History reviewed. No pertinent surgical history.  Family history: negative for birth defects, recurrent miscarriages, chromosomal abnormalities.   Social History     Tobacco Use    Smoking status: Never    Smokeless tobacco: Never   Substance Use Topics    Alcohol use: Not Currently    Drug use: Never     Review of patient's allergies indicates:  No Known Allergies  Medications:  PNV    Aneuploidy screening:   NIPT low risk    Records reviewed    OBJECTIVE:     Blood Pressure: 105/77    Ultrasound performed. See viewpoint for full ultrasound report.  Fetal size is AGA with the EFW at the 26% and the AC at the 40%. The EFW is 1811 g.  A limited repeat fetal anatomic survey shows no abnormalities of the structures that were adequately imaged.  AFV is normal.     ASSESSMENT/PLAN:     29 y.o.  female with IUP at 31w6d    Gestational diabetes mellitus (GDM) in third trimester  I counseled the patient regarding the risks of gestational diabetes, which include macrosomia, hypertensive disorders of pregnancy,  hypoglycemia, shoulder dystocia/birth trauma, polyhydramnios, and increased risk of  delivery.  Patients requiring medical therapy have an increased risk of  stillbirth.  Discussed that  outcome is linked to her ability to achieve glycemic control.  The goal of treatment is to have a fasting blood sugar between 70 and 95 mg/dL and 2 hour postprandials less than 120 mg/dL.  Therapy will likely consist of therapy with metformin or insulin. Approximately 30-50% of the time, women who are well-controlled on metformin may require the addition of insulin as the pregnancy progresses. Glyburide therapy has demonstrated inferior results as compared to metformin and insulin, and therefore, is not a first-line choice. Antepartum testing is indicated for those patients requiring medical therapy to reduce the incidence of fetal demise. We discussed dietary counseling and appropriate exercise to improve peripheral insulin resistance. We discussed the frequency of blood sugar monitoring and goal blood sugars. She has met with a diabetic educator this pregnancy. I would recommend obtaining serial growth ultrasounds in the third trimester to monitor for macrosomia.    Most women with GDM are cured by delivery. All medications can be stopped postpartum. However, some women with GDM have undiagnosed type 2 diabetes. Therefore, I recommend obtaining a postpartum fasting blood sugar prior to discharge. Approximately 20% of women with GDM will have glucose intolerance or type 2 DM at the postpartum visit. A 2 hour glucose tolerance test should be performed 6-8 weeks postpartum. If the patient is breastfeeding, it is reasonable to delay this as appropriate. Additionally, women with GDM are at increased risk of developing type 2 DM later in life. Therefore, establishing with a primary MD who can perform annual diabetes screening is appropriate.     BG log reviewed, vast majority within range.     Recommendations:  I reinforced checking 4 x/day and reinforced goal blood sugars. Primary OB to review BG logs at prenatal appointments. Please contact M if BGs are out of range >50% in any  column so that clinic appointment can be scheduled to address need for medication.  Regimen: Diet.  If medications are required, recommend growth scans every 4-6 weeks, starting at 28 weeks gestation  If medications are required, recommend starting antepartum testing at 32 weeks gestation (weekly NST+AFV or BPP); twice weekly testing is recommended if blood sugars are poorly controlled.   -Antepartum testing should be started at 40 weeks for women who do NOT require medications.  Check fasting blood sugar in hospital postpartum.   If normal, discontinue therapy and monitoring and recommend repeat postpartum glucose testing in 6-8 weeks postpartum using a 75 g oral glucose tolerance test.   If fasting blood glucose is between 100-125 mg/dl or impaired glucose tolerance is noted on 2 hour glucose test, refer to primary care as appropriate.   An ultrasound for estimated fetal weight should be obtained within 3 weeks of anticipated delivery; if the EFW is >= 4500 grams, a  should be offered.    Delivery timing:  Well controlled with diet: 39 0/7 - 40 6/7 weeks gestation  Oral agent or insulin required: 39 0/7 - 39 6/ 7 weeks gestation  Poorly controlled on oral agent or insulin: 38 0/7 - 38 6/7 weeks gestation      FOLLOW UP:   Growth ultrasound scheduled in 4 weeks    Today I have spent 20 minutes in the care of the patient. This includes face to face time and non-face to face time preparing to see the patient (eg, review of tests), obtaining and/or reviewing separately obtained history, documenting clinical information in the electronic or other health record, independently interpreting results and communicating results to the patient/family/caregiver, or care coordination.     Fatoumata Butterfield MD  Maternal Fetal Medicine

## 2024-09-09 NOTE — ASSESSMENT & PLAN NOTE
I counseled the patient regarding the risks of gestational diabetes, which include macrosomia, hypertensive disorders of pregnancy,  hypoglycemia, shoulder dystocia/birth trauma, polyhydramnios, and increased risk of  delivery.  Patients requiring medical therapy have an increased risk of stillbirth.  Discussed that  outcome is linked to her ability to achieve glycemic control.  The goal of treatment is to have a fasting blood sugar between 70 and 95 mg/dL and 2 hour postprandials less than 120 mg/dL.  Therapy will likely consist of therapy with metformin or insulin. Approximately 30-50% of the time, women who are well-controlled on metformin may require the addition of insulin as the pregnancy progresses. Glyburide therapy has demonstrated inferior results as compared to metformin and insulin, and therefore, is not a first-line choice. Antepartum testing is indicated for those patients requiring medical therapy to reduce the incidence of fetal demise. We discussed dietary counseling and appropriate exercise to improve peripheral insulin resistance. We discussed the frequency of blood sugar monitoring and goal blood sugars. She has met with a diabetic educator this pregnancy. I would recommend obtaining serial growth ultrasounds in the third trimester to monitor for macrosomia.    Most women with GDM are cured by delivery. All medications can be stopped postpartum. However, some women with GDM have undiagnosed type 2 diabetes. Therefore, I recommend obtaining a postpartum fasting blood sugar prior to discharge. Approximately 20% of women with GDM will have glucose intolerance or type 2 DM at the postpartum visit. A 2 hour glucose tolerance test should be performed 6-8 weeks postpartum. If the patient is breastfeeding, it is reasonable to delay this as appropriate. Additionally, women with GDM are at increased risk of developing type 2 DM later in life. Therefore, establishing with a primary  MD who can perform annual diabetes screening is appropriate.     BG log reviewed, vast majority within range.     Recommendations:  I reinforced checking 4 x/day and reinforced goal blood sugars. Primary OB to review BG logs at prenatal appointments. Please contact MFM if BGs are out of range >50% in any column so that clinic appointment can be scheduled to address need for medication.  Regimen: Diet.  If medications are required, recommend growth scans every 4-6 weeks, starting at 28 weeks gestation  If medications are required, recommend starting antepartum testing at 32 weeks gestation (weekly NST+AFV or BPP); twice weekly testing is recommended if blood sugars are poorly controlled.   -Antepartum testing should be started at 40 weeks for women who do NOT require medications.  Check fasting blood sugar in hospital postpartum.   If normal, discontinue therapy and monitoring and recommend repeat postpartum glucose testing in 6-8 weeks postpartum using a 75 g oral glucose tolerance test.   If fasting blood glucose is between 100-125 mg/dl or impaired glucose tolerance is noted on 2 hour glucose test, refer to primary care as appropriate.   An ultrasound for estimated fetal weight should be obtained within 3 weeks of anticipated delivery; if the EFW is >= 4500 grams, a  should be offered.    Delivery timing:  Well controlled with diet: 39 0/7 - 40 6/7 weeks gestation  Oral agent or insulin required: 39 0/7 - 39 6/ 7 weeks gestation  Poorly controlled on oral agent or insulin: 38 0/7 - 38 6/7 weeks gestation

## 2024-09-10 ENCOUNTER — PATIENT MESSAGE (OUTPATIENT)
Dept: OTHER | Facility: OTHER | Age: 29
End: 2024-09-10
Payer: COMMERCIAL

## 2024-09-10 ENCOUNTER — ROUTINE PRENATAL (OUTPATIENT)
Dept: OBSTETRICS AND GYNECOLOGY | Facility: CLINIC | Age: 29
End: 2024-09-10
Payer: COMMERCIAL

## 2024-09-10 VITALS
WEIGHT: 176.81 LBS | DIASTOLIC BLOOD PRESSURE: 68 MMHG | BODY MASS INDEX: 29.42 KG/M2 | SYSTOLIC BLOOD PRESSURE: 103 MMHG | HEART RATE: 92 BPM

## 2024-09-10 DIAGNOSIS — Z3A.32 32 WEEKS GESTATION OF PREGNANCY: Primary | ICD-10-CM

## 2024-09-10 DIAGNOSIS — O98.513 COVID-19 AFFECTING PREGNANCY IN THIRD TRIMESTER: ICD-10-CM

## 2024-09-10 DIAGNOSIS — U07.1 COVID-19 AFFECTING PREGNANCY IN THIRD TRIMESTER: ICD-10-CM

## 2024-09-10 DIAGNOSIS — O24.410 DIET CONTROLLED GESTATIONAL DIABETES MELLITUS (GDM) IN THIRD TRIMESTER: ICD-10-CM

## 2024-09-10 PROBLEM — Z34.90 PREGNANCY: Status: ACTIVE | Noted: 2024-09-10

## 2024-09-10 PROCEDURE — 99999 PR PBB SHADOW E&M-EST. PATIENT-LVL III: CPT | Mod: PBBFAC,,, | Performed by: ADVANCED PRACTICE MIDWIFE

## 2024-09-10 PROCEDURE — 0502F SUBSEQUENT PRENATAL CARE: CPT | Mod: CPTII,S$GLB,, | Performed by: ADVANCED PRACTICE MIDWIFE

## 2024-09-10 NOTE — PROGRESS NOTES
29 y.o. female  at 32w0d, by Estimated Date of Delivery: 24    Reports + FM, denies VB, LOF or regular CTX  Doing well without concerns.  TW lbs     PHYSICAL EXAM  /68   Pulse 92   Wt 80.2 kg (176 lb 12.9 oz)   LMP 2024 (Exact Date)   BMI 29.42 kg/m²   GENERAL: No acute distress  HEENT: Normocephalic, atraumatic  NEURO: Alert and oriented x3  PULMONARY: Non-labored respiration; no tachypnea  ABD: Soft, gravid, nontender.      ASSESSMENT AND PLAN  32 weeks gestation of pregnancy  -     RSV, preF A and preF B(PF) (Abrysvo) vaccine 120 mcg    Diet controlled gestational diabetes mellitus (GDM) in third trimester    COVID-19 affecting pregnancy in third trimester      Pt reports she has received Flu and TDAP within last month. Desires RSV vaccine - orders placed.  Delivery and blood consents reviewed and signed  Patient does plan to breast feed - reviewed breast feeding class here. Patient does not request Breast Pump Rx today.  It's a boy! They do desire circumcision.  Pediatrician: [facility]    Reviewed warning signs, normal FM,  labor precautions, and how/when to call.  Confirmed pt has after-hours number.    Follow-up: 2 weeks, call or present sooner PRN    Beatriz Reagan CNM/ALPA Vasquez CNM/ALPA

## 2024-09-23 NOTE — PROGRESS NOTES
29 y.o.,  at 33w6d, presents for routine OB appt.  Denies LOF, VB, or UCs. Reports good FM.  Doing well today, without concerns.   TW lbs     PHYSICAL EXAM  GENERAL: No acute distress  HEENT: Normocephalic, atraumatic  NEURO: Alert and oriented x3  PULMONARY: Non-labored respiration; no tachypnea  ABD: Soft, gravid, nontender.    ASSESSMENT AND PLAN  Gestational diabetes mellitus (GDM) in third trimester, gestational diabetes method of control unspecified  -Reviewed glucose longs. All fasting within normal range, only a few postprandial out of range.   Patient is walking nightly after dinner with .   -Next growth scan 10/724 with MFM.     34 weeks gestation of pregnancy      Reviewed upcoming 36wk labs; GBS next visit. Orders placed for TPA/HIV/CBC and will do prior to next visit.   Reviewed patient does NO have a history of genital HSV.   Had RSV vaccine.       Reviewed ABC risk assessment with the patient:  Birth Center Risk Assessment: 1-management on labor and Delivery, contingent on level of glycemic control per MFM  CNM management in ABC  CNM management on L&D  Consultation with OB to develop  plan of care  Collaborative CNM/OB management with delivery on L&D  Permanent referral of care to MD  Patient understands is currently not a candidate for the ABC. Reviewed potential risks which could arise, that could change this status.    Reviewed warning signs, normal FM,  labor precautions, and how/when to call. Confirmed pt has after-hours number.    Follow-up: 2 weeks, call or present sooner CHINMAY Vasquez CNM

## 2024-09-24 ENCOUNTER — ROUTINE PRENATAL (OUTPATIENT)
Dept: OBSTETRICS AND GYNECOLOGY | Facility: CLINIC | Age: 29
End: 2024-09-24
Payer: COMMERCIAL

## 2024-09-24 VITALS
DIASTOLIC BLOOD PRESSURE: 65 MMHG | HEART RATE: 84 BPM | BODY MASS INDEX: 29.39 KG/M2 | WEIGHT: 176.56 LBS | SYSTOLIC BLOOD PRESSURE: 102 MMHG

## 2024-09-24 DIAGNOSIS — O24.419 GESTATIONAL DIABETES MELLITUS (GDM) IN THIRD TRIMESTER, GESTATIONAL DIABETES METHOD OF CONTROL UNSPECIFIED: ICD-10-CM

## 2024-09-24 DIAGNOSIS — Z3A.34 34 WEEKS GESTATION OF PREGNANCY: Primary | ICD-10-CM

## 2024-09-24 PROCEDURE — 0502F SUBSEQUENT PRENATAL CARE: CPT | Mod: CPTII,S$GLB,,

## 2024-09-24 PROCEDURE — 99999 PR PBB SHADOW E&M-EST. PATIENT-LVL III: CPT | Mod: PBBFAC,,,

## 2024-10-01 ENCOUNTER — PATIENT MESSAGE (OUTPATIENT)
Dept: OTHER | Facility: OTHER | Age: 29
End: 2024-10-01
Payer: COMMERCIAL

## 2024-10-07 ENCOUNTER — LAB VISIT (OUTPATIENT)
Dept: LAB | Facility: OTHER | Age: 29
End: 2024-10-07
Payer: COMMERCIAL

## 2024-10-07 ENCOUNTER — PROCEDURE VISIT (OUTPATIENT)
Dept: MATERNAL FETAL MEDICINE | Facility: CLINIC | Age: 29
End: 2024-10-07
Payer: COMMERCIAL

## 2024-10-07 ENCOUNTER — ROUTINE PRENATAL (OUTPATIENT)
Dept: OBSTETRICS AND GYNECOLOGY | Facility: CLINIC | Age: 29
End: 2024-10-07
Payer: COMMERCIAL

## 2024-10-07 VITALS
BODY MASS INDEX: 29.26 KG/M2 | DIASTOLIC BLOOD PRESSURE: 67 MMHG | HEART RATE: 94 BPM | WEIGHT: 175.81 LBS | SYSTOLIC BLOOD PRESSURE: 103 MMHG

## 2024-10-07 DIAGNOSIS — O24.410 DIET CONTROLLED GESTATIONAL DIABETES MELLITUS (GDM) IN THIRD TRIMESTER: ICD-10-CM

## 2024-10-07 DIAGNOSIS — Z78.9 VEGETARIAN DIET: ICD-10-CM

## 2024-10-07 DIAGNOSIS — Z34.90 PREGNANCY, UNSPECIFIED GESTATIONAL AGE: Primary | ICD-10-CM

## 2024-10-07 DIAGNOSIS — Z3A.34 34 WEEKS GESTATION OF PREGNANCY: ICD-10-CM

## 2024-10-07 DIAGNOSIS — Z36.89 ENCOUNTER FOR ULTRASOUND TO CHECK FETAL GROWTH: ICD-10-CM

## 2024-10-07 LAB
BASOPHILS # BLD AUTO: 0.02 K/UL (ref 0–0.2)
BASOPHILS NFR BLD: 0.2 % (ref 0–1.9)
DIFFERENTIAL METHOD BLD: ABNORMAL
EOSINOPHIL # BLD AUTO: 0.1 K/UL (ref 0–0.5)
EOSINOPHIL NFR BLD: 0.6 % (ref 0–8)
ERYTHROCYTE [DISTWIDTH] IN BLOOD BY AUTOMATED COUNT: 13.2 % (ref 11.5–14.5)
HCT VFR BLD AUTO: 35.8 % (ref 37–48.5)
HGB BLD-MCNC: 12.2 G/DL (ref 12–16)
HIV 1+2 AB+HIV1 P24 AG SERPL QL IA: NEGATIVE
IMM GRANULOCYTES # BLD AUTO: 0.03 K/UL (ref 0–0.04)
IMM GRANULOCYTES NFR BLD AUTO: 0.3 % (ref 0–0.5)
LYMPHOCYTES # BLD AUTO: 1.4 K/UL (ref 1–4.8)
LYMPHOCYTES NFR BLD: 15.1 % (ref 18–48)
MCH RBC QN AUTO: 32.5 PG (ref 27–31)
MCHC RBC AUTO-ENTMCNC: 34.1 G/DL (ref 32–36)
MCV RBC AUTO: 96 FL (ref 82–98)
MONOCYTES # BLD AUTO: 0.5 K/UL (ref 0.3–1)
MONOCYTES NFR BLD: 5.7 % (ref 4–15)
NEUTROPHILS # BLD AUTO: 7.2 K/UL (ref 1.8–7.7)
NEUTROPHILS NFR BLD: 78.1 % (ref 38–73)
NRBC BLD-RTO: 0 /100 WBC
PLATELET # BLD AUTO: 152 K/UL (ref 150–450)
PMV BLD AUTO: 11.5 FL (ref 9.2–12.9)
RBC # BLD AUTO: 3.75 M/UL (ref 4–5.4)
TREPONEMA PALLIDUM IGG+IGM AB [PRESENCE] IN SERUM OR PLASMA BY IMMUNOASSAY: NONREACTIVE
WBC # BLD AUTO: 9.26 K/UL (ref 3.9–12.7)

## 2024-10-07 PROCEDURE — 76816 OB US FOLLOW-UP PER FETUS: CPT | Mod: S$GLB,,, | Performed by: OBSTETRICS & GYNECOLOGY

## 2024-10-07 PROCEDURE — 85025 COMPLETE CBC W/AUTO DIFF WBC: CPT

## 2024-10-07 PROCEDURE — 86593 SYPHILIS TEST NON-TREP QUANT: CPT

## 2024-10-07 PROCEDURE — 99999 PR PBB SHADOW E&M-EST. PATIENT-LVL III: CPT | Mod: PBBFAC,,, | Performed by: ADVANCED PRACTICE MIDWIFE

## 2024-10-07 PROCEDURE — 0502F SUBSEQUENT PRENATAL CARE: CPT | Mod: CPTII,S$GLB,, | Performed by: ADVANCED PRACTICE MIDWIFE

## 2024-10-07 PROCEDURE — 87389 HIV-1 AG W/HIV-1&-2 AB AG IA: CPT

## 2024-10-07 PROCEDURE — 36415 COLL VENOUS BLD VENIPUNCTURE: CPT

## 2024-10-07 NOTE — PROGRESS NOTES
29 y.o.,  at 35w6d here today for routine ob appt.    Complaints today: none.  Doing well without concerns. Denies LOF, VB, and cramping or regular contractions. Denies dysuria and any other s/s UTI. Denies HA, visual disturbances, and upper abdominal pain. Reports good fetal movement.    Verified no history of genital HSV.  TW lbs     PHYSICAL EXAM  GENERAL: No acute distress  HEENT: Normocephalic, atraumatic  NEURO: Alert and oriented x3  PULMONARY: Non-labored respiration; no tachypnea  ABD: Soft, gravid, nontender. per Leopold's.  FH 34 cm  + fhts 145    Growth US today, pending.  Consents signed today.  ASSESSMENT AND PLAN  Pregnancy, unspecified gestational age  -     Group B Streptococcus, PCR    Pescetarian Diet (2017), vegatarian from [id number])    Diet controlled gestational diabetes mellitus (GDM) in third trimester    Blood glucose log reviewed. Very good control. 3 values mildly elevated over past 8 days.  Continue to monitor as directed.    GBS next visit  HIV, TPA pending, done today    Reviewed warning signs, normal FM,  labor precautions, and how/when to call.      Follow-up: 1 week, call or present sooner MAXIMEN    Yajaira Ewing CNM/ALPA

## 2024-10-09 ENCOUNTER — OFFICE VISIT (OUTPATIENT)
Dept: PEDIATRICS | Facility: CLINIC | Age: 29
End: 2024-10-09
Payer: COMMERCIAL

## 2024-10-09 DIAGNOSIS — Z76.81 EXPECTANT PARENT PREBIRTH PEDIATRICIAN VISIT: Primary | ICD-10-CM

## 2024-10-09 PROCEDURE — 99499 UNLISTED E&M SERVICE: CPT | Mod: S$GLB,,, | Performed by: PEDIATRICS

## 2024-10-09 NOTE — PROGRESS NOTES
CC: Prenatal visit    Due date: 4 weeks  Gender: Boy  OB: Midwives    [facility] practice care reviewed.  All questions answered.

## 2024-10-15 ENCOUNTER — ROUTINE PRENATAL (OUTPATIENT)
Dept: OBSTETRICS AND GYNECOLOGY | Facility: CLINIC | Age: 29
End: 2024-10-15
Payer: COMMERCIAL

## 2024-10-15 VITALS
BODY MASS INDEX: 28.91 KG/M2 | DIASTOLIC BLOOD PRESSURE: 71 MMHG | SYSTOLIC BLOOD PRESSURE: 104 MMHG | HEART RATE: 97 BPM | WEIGHT: 173.75 LBS

## 2024-10-15 DIAGNOSIS — Z34.83 ENCOUNTER FOR SUPERVISION OF OTHER NORMAL PREGNANCY IN THIRD TRIMESTER: Primary | ICD-10-CM

## 2024-10-15 PROCEDURE — 99999 PR PBB SHADOW E&M-EST. PATIENT-LVL II: CPT | Mod: PBBFAC,,, | Performed by: ADVANCED PRACTICE MIDWIFE

## 2024-10-15 PROCEDURE — 87653 STREP B DNA AMP PROBE: CPT | Performed by: ADVANCED PRACTICE MIDWIFE

## 2024-10-15 NOTE — PROGRESS NOTES
29 y.o.,  at 37w0d presents today for routine OB appt.  Alone today.  GBS collected   Denies regular UCs, LOF or VB. Reports good FM. Doing well with no concerns.  TW lbs     PHYSICAL EXAM  GENERAL: No acute distress  HEENT: Normocephalic, atraumatic  NEURO: Alert and oriented x3  PULMONARY: Non-labored respiration; no tachypnea  ABD: Soft, gravid, nontender.    ASSESSMENT AND PLAN  Encounter for supervision of other normal pregnancy in third trimester      GBS collected and sent  Reviewed GBS testing and need for intrapartum abx if POS  Reviewed repeat 3rd trimester HIV/RPR done  Education regarding labor precautions.  Reviewed warning signs, normal FM, and how/when to call.      Follow-up: 1 week, call or present sooner PRN    Natalya Orellana CNM. MS

## 2024-10-17 LAB — GROUP B STREPTOCOCCUS, PCR: NEGATIVE

## 2024-10-22 ENCOUNTER — PATIENT MESSAGE (OUTPATIENT)
Dept: MATERNAL FETAL MEDICINE | Facility: CLINIC | Age: 29
End: 2024-10-22
Payer: COMMERCIAL

## 2024-10-22 ENCOUNTER — ROUTINE PRENATAL (OUTPATIENT)
Dept: OBSTETRICS AND GYNECOLOGY | Facility: CLINIC | Age: 29
End: 2024-10-22
Payer: COMMERCIAL

## 2024-10-22 VITALS
DIASTOLIC BLOOD PRESSURE: 73 MMHG | BODY MASS INDEX: 28.78 KG/M2 | SYSTOLIC BLOOD PRESSURE: 103 MMHG | WEIGHT: 172.94 LBS | HEART RATE: 92 BPM

## 2024-10-22 DIAGNOSIS — Z3A.38 38 WEEKS GESTATION OF PREGNANCY: Primary | ICD-10-CM

## 2024-10-22 DIAGNOSIS — O24.410 DIET CONTROLLED GESTATIONAL DIABETES MELLITUS (GDM) IN THIRD TRIMESTER: ICD-10-CM

## 2024-10-22 PROCEDURE — 99999 PR PBB SHADOW E&M-EST. PATIENT-LVL III: CPT | Mod: PBBFAC,,, | Performed by: ADVANCED PRACTICE MIDWIFE

## 2024-10-22 NOTE — PROGRESS NOTES
29 y.o.,  at 38w0d presents today for routine OB appt. Accompanied by , Jorge    Denies HANKS, visual disturbances, or upper abdominal pain/GI complaints. No regular UCs, denies LOF or VB. Reports good FM  Brings her glucose logs for review    Doing well with concerns regarding recurrent yeast infection - reports some vulvovaginal itching - denies sores, or abnormal odor/discharge. Planning to use Monistat again, as this has helped a few times earlier in the pregnancy, but wants to be sure still acceptable.    PHYSICAL EXAM  GENERAL: No acute distress  HEENT: Normocephalic, atraumatic  NEURO: Alert and oriented x3  PULMONARY: Non-labored respiration; no tachypnea  ABD: Soft, gravid, nontender.    ASSESSMENT AND PLAN  38 weeks gestation of pregnancy  -     IP OB Labor Induction; Future; Expected date: 2024  -      MFM Procedure ([facility])-Future; Future    Diet controlled gestational diabetes mellitus (GDM) in third trimester  -     IP OB Labor Induction; Future; Expected date: 2024  -      MFM Procedure ([facility])-Future; Future    Reviewed glucose logs - scanned into Media today. Majority WNL.   Requests VE: 2/80/-2, soft, midposition. (Angela = 8)  Discussed recommendations/timing for IOL. Pt desires at 39w5d on 11/3/24 at 1600 - plan Pitocin IOL.  Delivery consents have been signed  Reviewed warning signs, normal FM, labor precautions, and how/when to call.      Follow-up: 1 week, call or present sooner CHINMAY Reagan CNM, APRN     Skin normal color for race, warm, dry and intact. No evidence of rash.

## 2024-10-23 ENCOUNTER — PROCEDURE VISIT (OUTPATIENT)
Dept: MATERNAL FETAL MEDICINE | Facility: CLINIC | Age: 29
End: 2024-10-23
Payer: COMMERCIAL

## 2024-10-23 DIAGNOSIS — O24.410 DIET CONTROLLED GESTATIONAL DIABETES MELLITUS (GDM) IN THIRD TRIMESTER: ICD-10-CM

## 2024-10-23 DIAGNOSIS — Z3A.38 38 WEEKS GESTATION OF PREGNANCY: ICD-10-CM

## 2024-10-23 PROCEDURE — 76816 OB US FOLLOW-UP PER FETUS: CPT | Mod: S$GLB,,, | Performed by: OBSTETRICS & GYNECOLOGY

## 2024-10-29 ENCOUNTER — ROUTINE PRENATAL (OUTPATIENT)
Dept: OBSTETRICS AND GYNECOLOGY | Facility: CLINIC | Age: 29
End: 2024-10-29
Payer: COMMERCIAL

## 2024-10-29 VITALS
DIASTOLIC BLOOD PRESSURE: 70 MMHG | SYSTOLIC BLOOD PRESSURE: 103 MMHG | WEIGHT: 173.19 LBS | BODY MASS INDEX: 28.82 KG/M2 | HEART RATE: 90 BPM

## 2024-10-29 DIAGNOSIS — O24.410 DIET CONTROLLED GESTATIONAL DIABETES MELLITUS (GDM) IN THIRD TRIMESTER: ICD-10-CM

## 2024-10-29 DIAGNOSIS — Z3A.39 39 WEEKS GESTATION OF PREGNANCY: Primary | ICD-10-CM

## 2024-10-29 PROCEDURE — 0502F SUBSEQUENT PRENATAL CARE: CPT | Mod: CPTII,S$GLB,, | Performed by: ADVANCED PRACTICE MIDWIFE

## 2024-10-29 PROCEDURE — 99999 PR PBB SHADOW E&M-EST. PATIENT-LVL III: CPT | Mod: PBBFAC,,, | Performed by: ADVANCED PRACTICE MIDWIFE

## 2024-11-02 ENCOUNTER — HOSPITAL ENCOUNTER (INPATIENT)
Facility: OTHER | Age: 29
LOS: 2 days | Discharge: HOME | End: 2024-11-04
Attending: OBSTETRICS & GYNECOLOGY | Admitting: OBSTETRICS & GYNECOLOGY
Payer: COMMERCIAL

## 2024-11-02 ENCOUNTER — TELEPHONE (OUTPATIENT)
Dept: OBSTETRICS AND GYNECOLOGY | Facility: OTHER | Age: 29
End: 2024-11-02
Payer: COMMERCIAL

## 2024-11-02 DIAGNOSIS — Z3A.39 39 WEEKS GESTATION OF PREGNANCY: ICD-10-CM

## 2024-11-02 DIAGNOSIS — Z37.9 NORMAL LABOR: Primary | ICD-10-CM

## 2024-11-02 DIAGNOSIS — Z87.59 STATUS POST VACUUM-ASSISTED VAGINAL DELIVERY: ICD-10-CM

## 2024-11-02 LAB
ABO + RH BLD: NORMAL
BASOPHILS # BLD AUTO: 0.01 K/UL (ref 0–0.2)
BASOPHILS NFR BLD: 0.1 % (ref 0–1.9)
BLD GP AB SCN CELLS X3 SERPL QL: NORMAL
DIFFERENTIAL METHOD BLD: ABNORMAL
EOSINOPHIL # BLD AUTO: 0 K/UL (ref 0–0.5)
EOSINOPHIL NFR BLD: 0.2 % (ref 0–8)
ERYTHROCYTE [DISTWIDTH] IN BLOOD BY AUTOMATED COUNT: 12.9 % (ref 11.5–14.5)
HCT VFR BLD AUTO: 38.9 % (ref 37–48.5)
HGB BLD-MCNC: 13.3 G/DL (ref 12–16)
IMM GRANULOCYTES # BLD AUTO: 0.04 K/UL (ref 0–0.04)
IMM GRANULOCYTES NFR BLD AUTO: 0.4 % (ref 0–0.5)
LYMPHOCYTES # BLD AUTO: 0.8 K/UL (ref 1–4.8)
LYMPHOCYTES NFR BLD: 7.4 % (ref 18–48)
MCH RBC QN AUTO: 32.8 PG (ref 27–31)
MCHC RBC AUTO-ENTMCNC: 34.2 G/DL (ref 32–36)
MCV RBC AUTO: 96 FL (ref 82–98)
MONOCYTES # BLD AUTO: 0.4 K/UL (ref 0.3–1)
MONOCYTES NFR BLD: 3.1 % (ref 4–15)
NEUTROPHILS # BLD AUTO: 10 K/UL (ref 1.8–7.7)
NEUTROPHILS NFR BLD: 88.8 % (ref 38–73)
NRBC BLD-RTO: 0 /100 WBC
PLATELET # BLD AUTO: 136 K/UL (ref 150–450)
PMV BLD AUTO: 11.3 FL (ref 9.2–12.9)
RBC # BLD AUTO: 4.06 M/UL (ref 4–5.4)
SPECIMEN OUTDATE: NORMAL
TREPONEMA PALLIDUM IGG+IGM AB [PRESENCE] IN SERUM OR PLASMA BY IMMUNOASSAY: NONREACTIVE
WBC # BLD AUTO: 11.3 K/UL (ref 3.9–12.7)

## 2024-11-02 PROCEDURE — 99499 UNLISTED E&M SERVICE: CPT | Mod: ,,, | Performed by: STUDENT IN AN ORGANIZED HEALTH CARE EDUCATION/TRAINING PROGRAM

## 2024-11-02 PROCEDURE — 86593 SYPHILIS TEST NON-TREP QUANT: CPT | Performed by: ADVANCED PRACTICE MIDWIFE

## 2024-11-02 PROCEDURE — 72200006 HC VAGINAL DELIVERY LEVEL III

## 2024-11-02 PROCEDURE — 11000001 HC ACUTE MED/SURG PRIVATE ROOM

## 2024-11-02 PROCEDURE — 59025 FETAL NON-STRESS TEST: CPT

## 2024-11-02 PROCEDURE — 72200004 HC VAGINAL DELIVERY LEVEL I

## 2024-11-02 PROCEDURE — 4A0HXCZ MEASUREMENT OF PRODUCTS OF CONCEPTION, CARDIAC RATE, EXTERNAL APPROACH: ICD-10-PCS | Performed by: STUDENT IN AN ORGANIZED HEALTH CARE EDUCATION/TRAINING PROGRAM

## 2024-11-02 PROCEDURE — 25000003 PHARM REV CODE 250: Performed by: ADVANCED PRACTICE MIDWIFE

## 2024-11-02 PROCEDURE — 59400 OBSTETRICAL CARE: CPT | Mod: ,,, | Performed by: STUDENT IN AN ORGANIZED HEALTH CARE EDUCATION/TRAINING PROGRAM

## 2024-11-02 PROCEDURE — 59300 EPISIOTOMY OR VAGINAL REPAIR: CPT | Mod: 59,,, | Performed by: ADVANCED PRACTICE MIDWIFE

## 2024-11-02 PROCEDURE — 63600175 PHARM REV CODE 636 W HCPCS: Performed by: ADVANCED PRACTICE MIDWIFE

## 2024-11-02 PROCEDURE — 86901 BLOOD TYPING SEROLOGIC RH(D): CPT | Performed by: ADVANCED PRACTICE MIDWIFE

## 2024-11-02 PROCEDURE — 85025 COMPLETE CBC W/AUTO DIFF WBC: CPT | Performed by: ADVANCED PRACTICE MIDWIFE

## 2024-11-02 PROCEDURE — 63600175 PHARM REV CODE 636 W HCPCS

## 2024-11-02 PROCEDURE — 99285 EMERGENCY DEPT VISIT HI MDM: CPT | Mod: 25

## 2024-11-02 PROCEDURE — 72100002 HC LABOR CARE, 1ST 8 HOURS

## 2024-11-02 PROCEDURE — 0KQM0ZZ REPAIR PERINEUM MUSCLE, OPEN APPROACH: ICD-10-PCS | Performed by: STUDENT IN AN ORGANIZED HEALTH CARE EDUCATION/TRAINING PROGRAM

## 2024-11-02 RX ORDER — METHYLERGONOVINE MALEATE 0.2 MG/ML
200 INJECTION INTRAVENOUS ONCE AS NEEDED
Status: DISCONTINUED | OUTPATIENT
Start: 2024-11-02 | End: 2024-11-04 | Stop reason: HOSPADM

## 2024-11-02 RX ORDER — LIDOCAINE HYDROCHLORIDE 10 MG/ML
INJECTION, SOLUTION INFILTRATION; PERINEURAL
Status: COMPLETED
Start: 2024-11-02 | End: 2024-11-02

## 2024-11-02 RX ORDER — DIPHENHYDRAMINE HCL 25 MG
25 CAPSULE ORAL EVERY 4 HOURS PRN
Status: DISCONTINUED | OUTPATIENT
Start: 2024-11-02 | End: 2024-11-04 | Stop reason: HOSPADM

## 2024-11-02 RX ORDER — MISOPROSTOL 200 UG/1
800 TABLET ORAL ONCE AS NEEDED
Status: DISCONTINUED | OUTPATIENT
Start: 2024-11-02 | End: 2024-11-02

## 2024-11-02 RX ORDER — OXYTOCIN 10 [USP'U]/ML
10 INJECTION, SOLUTION INTRAMUSCULAR; INTRAVENOUS ONCE AS NEEDED
Status: DISCONTINUED | OUTPATIENT
Start: 2024-11-02 | End: 2024-11-04 | Stop reason: HOSPADM

## 2024-11-02 RX ORDER — METHYLERGONOVINE MALEATE 0.2 MG/ML
200 INJECTION INTRAVENOUS ONCE AS NEEDED
Status: DISCONTINUED | OUTPATIENT
Start: 2024-11-02 | End: 2024-11-02

## 2024-11-02 RX ORDER — TRANEXAMIC ACID 10 MG/ML
1000 INJECTION, SOLUTION INTRAVENOUS EVERY 30 MIN PRN
Status: DISCONTINUED | OUTPATIENT
Start: 2024-11-02 | End: 2024-11-04 | Stop reason: HOSPADM

## 2024-11-02 RX ORDER — SIMETHICONE 80 MG
1 TABLET,CHEWABLE ORAL 4 TIMES DAILY PRN
Status: DISCONTINUED | OUTPATIENT
Start: 2024-11-02 | End: 2024-11-02

## 2024-11-02 RX ORDER — OXYTOCIN-SODIUM CHLORIDE 0.9% IV SOLN 30 UNIT/500ML 30-0.9/5 UT/ML-%
95 SOLUTION INTRAVENOUS CONTINUOUS PRN
Status: DISCONTINUED | OUTPATIENT
Start: 2024-11-02 | End: 2024-11-04 | Stop reason: HOSPADM

## 2024-11-02 RX ORDER — HYDROMORPHONE HYDROCHLORIDE 1 MG/ML
1 INJECTION, SOLUTION INTRAMUSCULAR; INTRAVENOUS; SUBCUTANEOUS ONCE
Status: DISCONTINUED | OUTPATIENT
Start: 2024-11-02 | End: 2024-11-02

## 2024-11-02 RX ORDER — IBUPROFEN 600 MG/1
600 TABLET ORAL EVERY 6 HOURS
Status: DISCONTINUED | OUTPATIENT
Start: 2024-11-02 | End: 2024-11-04 | Stop reason: HOSPADM

## 2024-11-02 RX ORDER — KETOROLAC TROMETHAMINE 30 MG/ML
15 INJECTION, SOLUTION INTRAMUSCULAR; INTRAVENOUS ONCE
Status: COMPLETED | OUTPATIENT
Start: 2024-11-02 | End: 2024-11-02

## 2024-11-02 RX ORDER — MISOPROSTOL 200 UG/1
800 TABLET ORAL ONCE AS NEEDED
Status: DISCONTINUED | OUTPATIENT
Start: 2024-11-02 | End: 2024-11-04 | Stop reason: HOSPADM

## 2024-11-02 RX ORDER — DIPHENOXYLATE HYDROCHLORIDE AND ATROPINE SULFATE 2.5; .025 MG/1; MG/1
2 TABLET ORAL EVERY 6 HOURS PRN
Status: DISCONTINUED | OUTPATIENT
Start: 2024-11-02 | End: 2024-11-04 | Stop reason: HOSPADM

## 2024-11-02 RX ORDER — OXYTOCIN-SODIUM CHLORIDE 0.9% IV SOLN 30 UNIT/500ML 30-0.9/5 UT/ML-%
95 SOLUTION INTRAVENOUS ONCE AS NEEDED
Status: DISCONTINUED | OUTPATIENT
Start: 2024-11-02 | End: 2024-11-02

## 2024-11-02 RX ORDER — TRANEXAMIC ACID 10 MG/ML
1000 INJECTION, SOLUTION INTRAVENOUS EVERY 30 MIN PRN
Status: DISCONTINUED | OUTPATIENT
Start: 2024-11-02 | End: 2024-11-02

## 2024-11-02 RX ORDER — OXYTOCIN-SODIUM CHLORIDE 0.9% IV SOLN 30 UNIT/500ML 30-0.9/5 UT/ML-%
10 SOLUTION INTRAVENOUS ONCE AS NEEDED
Status: DISCONTINUED | OUTPATIENT
Start: 2024-11-02 | End: 2024-11-04 | Stop reason: HOSPADM

## 2024-11-02 RX ORDER — SIMETHICONE 80 MG
1 TABLET,CHEWABLE ORAL EVERY 6 HOURS PRN
Status: DISCONTINUED | OUTPATIENT
Start: 2024-11-02 | End: 2024-11-04 | Stop reason: HOSPADM

## 2024-11-02 RX ORDER — PRENATAL WITH FERROUS FUM AND FOLIC ACID 3080; 920; 120; 400; 22; 1.84; 3; 20; 10; 1; 12; 200; 27; 25; 2 [IU]/1; [IU]/1; MG/1; [IU]/1; MG/1; MG/1; MG/1; MG/1; MG/1; MG/1; UG/1; MG/1; MG/1; MG/1; MG/1
1 TABLET ORAL DAILY
Status: DISCONTINUED | OUTPATIENT
Start: 2024-11-02 | End: 2024-11-04 | Stop reason: HOSPADM

## 2024-11-02 RX ORDER — HYDROCORTISONE 25 MG/G
CREAM TOPICAL 3 TIMES DAILY PRN
Status: DISCONTINUED | OUTPATIENT
Start: 2024-11-02 | End: 2024-11-04 | Stop reason: HOSPADM

## 2024-11-02 RX ORDER — SODIUM CHLORIDE 0.9 % (FLUSH) 0.9 %
10 SYRINGE (ML) INJECTION
Status: DISCONTINUED | OUTPATIENT
Start: 2024-11-02 | End: 2024-11-04 | Stop reason: HOSPADM

## 2024-11-02 RX ORDER — ONDANSETRON 8 MG/1
8 TABLET, ORALLY DISINTEGRATING ORAL EVERY 8 HOURS PRN
Status: DISCONTINUED | OUTPATIENT
Start: 2024-11-02 | End: 2024-11-04 | Stop reason: HOSPADM

## 2024-11-02 RX ORDER — DOCUSATE SODIUM 100 MG/1
200 CAPSULE, LIQUID FILLED ORAL 2 TIMES DAILY PRN
Status: DISCONTINUED | OUTPATIENT
Start: 2024-11-02 | End: 2024-11-04 | Stop reason: HOSPADM

## 2024-11-02 RX ORDER — CARBOPROST TROMETHAMINE 250 UG/ML
250 INJECTION, SOLUTION INTRAMUSCULAR
Status: DISCONTINUED | OUTPATIENT
Start: 2024-11-02 | End: 2024-11-02

## 2024-11-02 RX ORDER — OXYTOCIN-SODIUM CHLORIDE 0.9% IV SOLN 30 UNIT/500ML 30-0.9/5 UT/ML-%
10 SOLUTION INTRAVENOUS ONCE AS NEEDED
Status: DISCONTINUED | OUTPATIENT
Start: 2024-11-02 | End: 2024-11-02

## 2024-11-02 RX ORDER — ACETAMINOPHEN 325 MG/1
650 TABLET ORAL EVERY 6 HOURS SCHEDULED
Status: DISCONTINUED | OUTPATIENT
Start: 2024-11-02 | End: 2024-11-04 | Stop reason: HOSPADM

## 2024-11-02 RX ORDER — LIDOCAINE HYDROCHLORIDE 10 MG/ML
10 INJECTION, SOLUTION INFILTRATION; PERINEURAL ONCE AS NEEDED
Status: COMPLETED | OUTPATIENT
Start: 2024-11-02 | End: 2024-11-02

## 2024-11-02 RX ORDER — HYDROCODONE BITARTRATE AND ACETAMINOPHEN 7.5; 325 MG/1; MG/1
1 TABLET ORAL EVERY 4 HOURS PRN
Status: DISCONTINUED | OUTPATIENT
Start: 2024-11-02 | End: 2024-11-04 | Stop reason: HOSPADM

## 2024-11-02 RX ORDER — OXYTOCIN-SODIUM CHLORIDE 0.9% IV SOLN 30 UNIT/500ML 30-0.9/5 UT/ML-%
95 SOLUTION INTRAVENOUS CONTINUOUS PRN
Status: DISCONTINUED | OUTPATIENT
Start: 2024-11-02 | End: 2024-11-02

## 2024-11-02 RX ORDER — KETOROLAC TROMETHAMINE 30 MG/ML
INJECTION, SOLUTION INTRAMUSCULAR; INTRAVENOUS
Status: COMPLETED
Start: 2024-11-02 | End: 2024-11-02

## 2024-11-02 RX ORDER — HYDROCODONE BITARTRATE AND ACETAMINOPHEN 5; 325 MG/1; MG/1
1 TABLET ORAL EVERY 4 HOURS PRN
Status: DISCONTINUED | OUTPATIENT
Start: 2024-11-02 | End: 2024-11-04 | Stop reason: HOSPADM

## 2024-11-02 RX ORDER — SODIUM CHLORIDE 9 MG/ML
INJECTION, SOLUTION INTRAVENOUS
Status: DISCONTINUED | OUTPATIENT
Start: 2024-11-02 | End: 2024-11-02

## 2024-11-02 RX ORDER — ONDANSETRON 8 MG/1
8 TABLET, ORALLY DISINTEGRATING ORAL EVERY 8 HOURS PRN
Status: DISCONTINUED | OUTPATIENT
Start: 2024-11-02 | End: 2024-11-02

## 2024-11-02 RX ORDER — OXYTOCIN 10 [USP'U]/ML
10 INJECTION, SOLUTION INTRAMUSCULAR; INTRAVENOUS ONCE AS NEEDED
Status: COMPLETED | OUTPATIENT
Start: 2024-11-02 | End: 2024-11-02

## 2024-11-02 RX ORDER — CARBOPROST TROMETHAMINE 250 UG/ML
250 INJECTION, SOLUTION INTRAMUSCULAR
Status: DISCONTINUED | OUTPATIENT
Start: 2024-11-02 | End: 2024-11-04 | Stop reason: HOSPADM

## 2024-11-02 RX ORDER — MUPIROCIN 20 MG/G
OINTMENT TOPICAL
Status: DISCONTINUED | OUTPATIENT
Start: 2024-11-02 | End: 2024-11-02

## 2024-11-02 RX ORDER — SODIUM CHLORIDE, SODIUM LACTATE, POTASSIUM CHLORIDE, CALCIUM CHLORIDE 600; 310; 30; 20 MG/100ML; MG/100ML; MG/100ML; MG/100ML
INJECTION, SOLUTION INTRAVENOUS CONTINUOUS
Status: DISCONTINUED | OUTPATIENT
Start: 2024-11-02 | End: 2024-11-02

## 2024-11-02 RX ORDER — MISOPROSTOL 200 UG/1
800 TABLET ORAL ONCE AS NEEDED
Status: COMPLETED | OUTPATIENT
Start: 2024-11-02 | End: 2024-11-02

## 2024-11-02 RX ORDER — DIPHENHYDRAMINE HYDROCHLORIDE 50 MG/ML
25 INJECTION INTRAMUSCULAR; INTRAVENOUS EVERY 4 HOURS PRN
Status: DISCONTINUED | OUTPATIENT
Start: 2024-11-02 | End: 2024-11-04 | Stop reason: HOSPADM

## 2024-11-02 RX ORDER — OXYTOCIN-SODIUM CHLORIDE 0.9% IV SOLN 30 UNIT/500ML 30-0.9/5 UT/ML-%
95 SOLUTION INTRAVENOUS ONCE AS NEEDED
Status: DISCONTINUED | OUTPATIENT
Start: 2024-11-02 | End: 2024-11-04 | Stop reason: HOSPADM

## 2024-11-02 RX ORDER — CALCIUM CARBONATE 200(500)MG
500 TABLET,CHEWABLE ORAL 3 TIMES DAILY PRN
Status: DISCONTINUED | OUTPATIENT
Start: 2024-11-02 | End: 2024-11-02

## 2024-11-02 RX ORDER — DIPHENOXYLATE HYDROCHLORIDE AND ATROPINE SULFATE 2.5; .025 MG/1; MG/1
2 TABLET ORAL EVERY 6 HOURS PRN
Status: DISCONTINUED | OUTPATIENT
Start: 2024-11-02 | End: 2024-11-02

## 2024-11-02 RX ORDER — KETOROLAC TROMETHAMINE 30 MG/ML
15 INJECTION, SOLUTION INTRAMUSCULAR; INTRAVENOUS ONCE
Status: DISCONTINUED | OUTPATIENT
Start: 2024-11-02 | End: 2024-11-02

## 2024-11-02 RX ADMIN — OXYTOCIN 10 UNITS: 10 INJECTION, SOLUTION INTRAMUSCULAR; INTRAVENOUS at 08:11

## 2024-11-02 RX ADMIN — LIDOCAINE HYDROCHLORIDE 10 ML: 10 INJECTION, SOLUTION INFILTRATION; PERINEURAL at 08:11

## 2024-11-02 RX ADMIN — IBUPROFEN 600 MG: 600 TABLET, FILM COATED ORAL at 04:11

## 2024-11-02 RX ADMIN — PRENATAL VIT W/ FE FUMARATE-FA TAB 27-0.8 MG 1 TABLET: 27-0.8 TAB at 12:11

## 2024-11-02 RX ADMIN — IBUPROFEN 600 MG: 600 TABLET, FILM COATED ORAL at 09:11

## 2024-11-02 RX ADMIN — ACETAMINOPHEN 650 MG: 325 TABLET, FILM COATED ORAL at 12:11

## 2024-11-02 RX ADMIN — DOCUSATE SODIUM 200 MG: 100 CAPSULE, LIQUID FILLED ORAL at 07:11

## 2024-11-02 RX ADMIN — MISOPROSTOL 800 MCG: 200 TABLET ORAL at 08:11

## 2024-11-02 RX ADMIN — ACETAMINOPHEN 650 MG: 325 TABLET, FILM COATED ORAL at 05:11

## 2024-11-02 RX ADMIN — KETOROLAC TROMETHAMINE 15 MG: 30 INJECTION INTRAMUSCULAR; INTRAVENOUS at 08:11

## 2024-11-02 RX ADMIN — KETOROLAC TROMETHAMINE 15 MG: 30 INJECTION, SOLUTION INTRAMUSCULAR; INTRAVENOUS at 08:11

## 2024-11-02 NOTE — ED PROVIDER NOTES
Encounter Date: 2024     History   No chief complaint on file.    Adina Jenkins is a 29 y.o. [id number] at 39w4d who presents complaining of frequent painful contractions. History limited secondary to patient's acute distress secondary to active labor. Reports SROM at 0700. Patient immediately examined per Dr. Diamond with patient consent and found to be complete and +1 station. Rapidly moved to L&D for delivery.      Review of patient's allergies indicates:  No Known Allergies  Past Medical History:   Diagnosis Date    Abnormal Pap smear of cervix     required repeat pap and normal, +HPV (garsasil vaccinated)    Anxiety disorder, unspecified     seeing therapist    Gestational diabetes     Irregular menstrual cycle 2023     No past surgical history on file.  Family History   Problem Relation Name Age of Onset    Breast cancer Paternal Grandmother  70    Diabetes type II Father Jasson         on insulin    Diabetes type II Mother Adina         orals    Seizures Mother Adina     Colon cancer Maternal Aunt Shanna     Ovarian cancer Neg Hx       Social History     Tobacco Use    Smoking status: Never    Smokeless tobacco: Never   Substance Use Topics    Alcohol use: Not Currently    Drug use: Never     Review of Systems   Reason unable to perform ROS: acutely distressed.       Physical Exam     Initial Vitals   BP Pulse Resp Temp SpO2   -- -- -- -- --      MAP       --         Physical Exam    Constitutional: She appears well-developed and well-nourished. She appears distressed.   HENT:   Head: Normocephalic and atraumatic.   Pulmonary/Chest: No respiratory distress.   Abdominal:   Gravid   Musculoskeletal:         General: Normal range of motion.     Neurological: She is alert.   Skin: Skin is warm and dry.     OB LABOR EXAM:             Dilatation: 10.   Station: +1.   Effacement: 100%.             ED Course   Fetal non-stress test    Date/Time: 2024 8:26 AM    Performed by: Margot Diamond  MD  Authorized by: Margot Diamond MD    Post-procedure:      Unable to be performed secondary to need to urgently move to L&D for imminent delivery    Labs Reviewed          Imaging Results    None          Medications   HYDROmorphone injection 1 mg (has no administration in time range)   lactated ringers bolus 1,000 mL (has no administration in time range)   lactated ringers bolus 500 mL (has no administration in time range)   lactated ringers infusion (has no administration in time range)   0.9%  NaCl infusion (has no administration in time range)   mupirocin 2 % ointment (has no administration in time range)   oxytocin 30 units/500 mL (60 milliunits/mL) in 0.9% NaCl IV bolus from bag (has no administration in time range)   oxytocin 30 units/500 mL (60 milliunits/mL) in 0.9% NaCl (non-titrating) (has no administration in time range)   ondansetron disintegrating tablet 8 mg (has no administration in time range)   calcium carbonate 200 mg calcium (500 mg) chewable tablet 500 mg (has no administration in time range)   simethicone chewable tablet 80 mg (has no administration in time range)   oxytocin 30 units/500 mL (60 milliunits/mL) in 0.9% NaCl IV bolus from bag (has no administration in time range)   oxytocin 30 units/500 mL (60 milliunits/mL) in 0.9% NaCl (non-titrating) (has no administration in time range)   miSOPROStoL tablet 800 mcg (has no administration in time range)   miSOPROStoL tablet 800 mcg (has no administration in time range)   methylergonovine injection 200 mcg (has no administration in time range)   carboprost injection 250 mcg (has no administration in time range)   tranexamic acid in NaCl,iso-os IVPB 1,000 mg (has no administration in time range)   diphenoxylate-atropine 2.5-0.025 mg per tablet 2 tablet (has no administration in time range)   ketorolac injection 15 mg (has no administration in time range)   ketorolac (TORADOL) 30 mg/mL (1 mL) injection (has no administration in time range)    LIDOcaine HCL 10 mg/ml (1%) injection 10 mL (10 mLs Intradermal Given by Provider [id number])   oxytocin injection 10 Units (10 Units Intramuscular Given 24)     Medical Decision Making  Adina Jenkins is a 29 y.o. [id number] at 39w4d who presents complaining of frequent painful contractions with SROM at 0700, history and physical limited secondary to acute distress from active labor.    SVE 10/100/+1, fetal sutures palpable  NST/[place] unable to be performed due to rapid transfer to L&D for imminent delivery  CNM notified and en route  Moved urgently to L&D  GBS negative  Anticipate     Risk  Prescription drug management.              Attending Attestation:   Physician Attestation Statement for Resident:  As the supervising MD   Physician Attestation Statement: I have personally seen and examined this patient.   I agree with the above history.  -:   As the supervising MD I agree with the above PE.     As the supervising MD I agree with the above treatment, course, plan, and disposition.   I was personally present during the critical portions of the procedure(s) performed by the resident and was immediately available in the ED to provide services and assistance as needed during the entire procedure.   I have reviewed the following: old records at this facility.                      I agree with the above edited resident note. Pt seen and examined, chart and labs reviewed.    Briefly, 30 YO  presented in labor. SVE complete/+1 station with palpable fetal sutures. Patient with the urge to push, moved immediately to labor and delivery. FHT to be obtained on L&D.     Margot Diamond MD   OBN Hospitalist                   Clinical Impression:  Final diagnoses:  [O80, Z37.9] Normal labor (Primary)  [Z3A.39] 39 weeks gestation of pregnancy          ED Disposition Condition    Send to L&D Margot Rachel MD  [phone]

## 2024-11-02 NOTE — SUBJECTIVE & OBJECTIVE
Obstetric HPI:  PT admitted from [facility] complete/+2. Pt reports she started having ctx at 0700 and felt the urge to have a BM, then had SROM, clear fluid. Transferred to [facility] [place] where FHR were in the 80's, pt unable to delivery baby with strong efforts, verbal consent given from pt to do a vacuum extraction, Dr Diamond called to BS, vacuum placed per Dr Diamond and baby delivered, vigorous and crying. NICU at  for del. After del of the placenta , IM Pitocin was administer and repair completed. Uterus palpates boggy with no bleeding noted, Cytotec 800 mcg pr administered.   This pregnancy has been complicated by GDM no meds, stable blood sugars throughout the pregnancy.      OB History    Para Term  AB Living   2 0 0 0 1 0   SAB IAB Ectopic Multiple Live Births   1 0 0 0 0      # Outcome Date GA Lbr Mitchell/2nd Weight Sex Type Anes PTL Lv   2 Current            1 SAB 24     SAB        Past Medical History:   Diagnosis Date    Abnormal Pap smear of cervix     required repeat pap and normal, +HPV (garsasil vaccinated)    Anxiety disorder, unspecified     seeing therapist    Gestational diabetes     Irregular menstrual cycle 2023     No past surgical history on file.    Facility-Administered Medications Prior to Admission   Medication    RSV, preF A and preF B(PF) (Abrysvo) vaccine 120 mcg     PTA Medications   Medication Sig    blood sugar diagnostic (TRUE METRIX GLUCOSE TEST STRIP) Strp 1 each by Misc.(Non-Drug; Combo Route) route 4 (four) times daily.    blood sugar diagnostic Strp To check BG 4 times daily, to use with insurance preferred meter    blood-glucose meter kit To check BG 4 times daily, to use with insurance preferred meter    lancets Misc To check BG 4 times daily, to use with insurance preferred meter    prenat.vits,jak,min-iron-folic Tab Take by mouth.       Review of patient's allergies indicates:  No Known Allergies     Family History       Problem Relation (Age of Onset)    Breast  cancer Paternal Grandmother (70)    Colon cancer Maternal Aunt    Diabetes type II Father, Mother    Seizures Mother          Tobacco Use    Smoking status: Never    Smokeless tobacco: Never   Substance and Sexual Activity    Alcohol use: Not Currently    Drug use: Never    Sexual activity: Yes     Partners: Male     Birth control/protection: None     Comment: Jorge     Review of Systems   All other systems reviewed and are negative.     Objective:     Vital Signs (Most Recent):    Vital Signs (24h Range):           There is no height or weight on file to calculate BMI.       Physical Exam:   Constitutional: She is oriented to person, place, and time. She appears well-developed and well-nourished.    HENT:   Head: Normocephalic.    Eyes: Pupils are equal, round, and reactive to light.     Cardiovascular:  Normal rate and regular rhythm.             Pulmonary/Chest: Effort normal.        Abdominal: Soft.     Genitourinary:    Vagina and uterus normal.             Musculoskeletal: Normal range of motion.       Neurological: She is alert and oriented to person, place, and time.    Skin: Skin is warm and dry.    Psychiatric: She has a normal mood and affect. Her behavior is normal. Judgment and thought content normal.          Significant Labs:  Lab Results   Component Value Date    GROUPTRH AB POS 04/23/2024    HEPBSAG Non-reactive 03/25/2024       PT delivered via Vacuum per Dr Diamond

## 2024-11-02 NOTE — H&P
[facility] - Labor & Delivery  Obstetrics  History & Physical    Patient Name: Adina Jenkins  MRN: [id number]  Admission Date: 2024  Primary Care Provider: Sarah Beth, Primary Doctor    Subjective:     Principal Problem:Status post vacuum-assisted vaginal delivery    History of Present Illness:  No notes on file    Obstetric HPI:  PT admitted from [facility] complete/+2. Pt reports she started having ctx at 0700 and felt the urge to have a BM, then had SROM, clear fluid. Transferred to [facility] where FHR were in the 80's, pt unable to delivery baby with strong efforts, verbal consent given from pt to do a vacuum extraction, Dr Diamond called to BS, vacuum placed per Dr Diamond and baby delivered, vigorous and crying. NICU at  for del. After del of the placenta , IM Pitocin was administer and repair completed. Uterus palpates boggy with no bleeding noted, Cytotec 800 mcg pr administered.   This pregnancy has been complicated by GDM no meds, stable blood sugars throughout the pregnancy.      OB History    Para Term  AB Living   2 0 0 0 1 0   SAB IAB Ectopic Multiple Live Births   1 0 0 0 0      # Outcome Date GA Lbr Mitchell/2nd Weight Sex Type Anes PTL Lv   2 Current            1 SAB 24     SAB        Past Medical History:   Diagnosis Date    Abnormal Pap smear of cervix     required repeat pap and normal, +HPV (garsasil vaccinated)    Anxiety disorder, unspecified     seeing therapist    Gestational diabetes     Irregular menstrual cycle 2023     No past surgical history on file.    Facility-Administered Medications Prior to Admission   Medication    RSV, preF A and preF B(PF) (Abrysvo) vaccine 120 mcg     PTA Medications   Medication Sig    blood sugar diagnostic (TRUE METRIX GLUCOSE TEST STRIP) Strp 1 each by Misc.(Non-Drug; Combo Route) route 4 (four) times daily.    blood sugar diagnostic Strp To check BG 4 times daily, to use with insurance preferred meter    blood-glucose meter kit To check BG 4 times daily,  to use with insurance preferred meter    lancets Misc To check BG 4 times daily, to use with insurance preferred meter    prenat.vits,jak,min-iron-folic Tab Take by mouth.       Review of patient's allergies indicates:  No Known Allergies     Family History       Problem Relation (Age of Onset)    Breast cancer Paternal Grandmother (70)    Colon cancer Maternal Aunt    Diabetes type II Father, Mother    Seizures Mother          Tobacco Use    Smoking status: Never    Smokeless tobacco: Never   Substance and Sexual Activity    Alcohol use: Not Currently    Drug use: Never    Sexual activity: Yes     Partners: Male     Birth control/protection: None     Comment: Jorge     Review of Systems   All other systems reviewed and are negative.     Objective:     Vital Signs (Most Recent):    Vital Signs (24h Range):           There is no height or weight on file to calculate BMI.       Physical Exam:   Constitutional: She is oriented to person, place, and time. She appears well-developed and well-nourished.    HENT:   Head: Normocephalic.    Eyes: Pupils are equal, round, and reactive to light.     Cardiovascular:  Normal rate and regular rhythm.             Pulmonary/Chest: Effort normal.        Abdominal: Soft.     Genitourinary:    Vagina and uterus normal.             Musculoskeletal: Normal range of motion.       Neurological: She is alert and oriented to person, place, and time.    Skin: Skin is warm and dry.    Psychiatric: She has a normal mood and affect. Her behavior is normal. Judgment and thought content normal.          Significant Labs:  Lab Results   Component Value Date    GROUPTRH AB POS 2024    HEPBSAG Non-reactive 2024       PT delivered via Vacuum per Dr Diamond   Assessment/Plan:     29 y.o. female  at 39w4d for:    No notes have been filed under this hospital service.  Service: Obstetrics and Gynecology      Khalida Billy CNM  Obstetrics  [facility] - Labor & Delivery

## 2024-11-02 NOTE — PLAN OF CARE
Pt ambulating and voiding without difficulty. Patient safety maintained, side rails up, bed low and locked position.  Pain well controlled with pain medication. Fundus midline, firm, with moderate lochia. VSS. Significant other at bedside; parents responding to infant cues and bonding appropriately. Will continue to intervene as necessary.

## 2024-11-02 NOTE — LACTATION NOTE
11/02/24 1615   Maternal Assessment   Breast Shape Bilateral:;round   Breast Density Bilateral:;soft   Areola Bilateral:;elastic   Nipples Right:;short;Left:;flat   Maternal Infant Feeding   Maternal Emotional State assist needed;relaxed   Infant Positioning clutch/football   Signs of Milk Transfer audible swallow;infant jaw motion present   Latch Assistance yes     Assisted pt with position and latch. Attempted to latch baby to left breast in football hold.pt with flat nipple that retracts with latch attempt. Baby unable to grasp breast. Baby moved to right breast. Nipple graspable. Baby able to latch effectively to breast after several attempts. Good tugs and pulls observed. Breast compression and stimulation needed to keep baby actively breastfeeding. Attempted latch again on left breast. Nipple shield utilized. Baby sucked a few minutes. Breastfeeding education provided. Questions answered.Skin to skin encouraged.

## 2024-11-02 NOTE — HOSPITAL COURSE
11/2/24 PT admitted from [facility] complete/+2. Pt reports she started having ctx at 0700 and felt the urge to have a BM, then had SROM, clear fluid. Transferred to [facility] where FHR were in the 80's, pt unable to delivery baby with strong efforts, verbal consent given from pt to do a vacuum extraction, Dr Diamond called to BS, vacuum placed per Dr Diamond and baby delivered, vigorous and crying. NICU at  for del. After del of the placenta , IM Pitocin was administer and repair completed. Uterus palpates boggy with no bleeding noted, Cytotec 800 mcg pr administered.     11/3/2024- PPD#1- Pt doing well, normal involution. Breastfeeding well, has seen lactation. Desires to d/c home today.    11/4/24: PPD2, doing well, normal involution, desires d/ home today, f/u x 6 weeks or sooner PRN

## 2024-11-02 NOTE — L&D DELIVERY NOTE
[facility] - Labor & Delivery  Vaginal Delivery   Operative Note    SUMMARY     Vacuum assisted vaginal delivery of live infant, per Dr Diamond, skin to skin was unable to be performed due to noted fetal distress prior to the delivery. Infant immediately handed to NICU team after cord was cut.  .  Infant delivered position CELINE over intact perineum.  Nuchal cord: No.    Spontaneous delivery of placenta and IM pitocin given noting uterine atony without bleeding. Cytotec 800 mcg pr given.   2nd degree laceration noted and repaired in normal fashion with 2-0 Vicryl. Proper hemostasis noted. One left labial abrasion noted, hemostatic and not repaired.  .  Patient tolerated delivery well. Sponge needle and lap counted correctly x2.    Indications: Status post vacuum-assisted vaginal delivery  Pregnancy complicated by:   Patient Active Problem List   Diagnosis    h/o Elevated alanine aminotransferase (ALT) level    Pescetarian Diet (2017), vegatarian from [id number])    Hx of abnormal cervical Pap smear    Kidney stone    Migraine    Gestational diabetes mellitus (GDM) in third trimester    Pregnancy    COVID-19 affecting pregnancy in third trimester    Normal labor    Status post vacuum-assisted vaginal delivery     Admitting GA: 39w4d    Delivery Information for Arnulfo Jenkins    Birth information:  YOB: 2025   Time of birth: 8:22 AM   Sex: male   Head Delivery Date/Time: 2024  8:22 AM   Delivery type: Vaginal, Spontaneous   Gestational Age: 39w4d       Delivery Providers    Delivering clinician: Margot Diamond MD   Provider Role    Khalida Billy CNM Midwife    Tianna Bello, RN Charge Nurse    Monisha Johnson RN Delivery Nurse    Yobani Gomez RN Registered Nurse    [facility]    Virginiach, Ann, RN Registered Nurse              Measurements    Weight:   Length:          Apgars    Living status: Living  Apgar Component Scores:  1 min.:  5 min.:  10 min.:  15 min.:   20 min.:    Skin color:  0  1       Heart rate:  2  2       Reflex irritability:  2  2       Muscle tone:  2  2       Respiratory effort:  2  2       Total:  8  9       Apgars assigned by: NICU         Operative Delivery    Forceps attempted?: No  Vacuum extractor attempted?: Yes  Vacuum indications: Fetal Heart Rate or Rhythm Abnormality  Vacuum type: Kiwi  First attempt time vacuum applied: 2024 08:18:00  First attempt time vacuum removed: 2024 08:19:00  Second attempt time vacuum applied: 2024 08:19:00  Second attempt time vacuum removed: 2024 08:19:00  Third attempt time vacuum applied: 2024 08:20:00  Third attempt time vacuum removed: 2024 08:21:00  Number of pop offs: 2  Number of pulls with vacuum: 3  Total vacuum application time: 30 seconds  Vacuum applied by: DANNIE MURRAY MD  Failed vacuum delivery?: No         Shoulder Dystocia    Shoulder dystocia present?: No           Presentation    Presentation: Vertex  Position: Left Occiput Anterior           Interventions/Resuscitation    Method: NICU Attended       Cord    Vessels: 3 vessels  Complications: None  Delayed Cord Clamping?: No  Cord Blood Disposition: Sent with Baby  Gases Sent?: No  Stem Cell Collection (by MD): No       Placenta    Placenta delivery date/time: 2024 0846  Placenta removal: Spontaneous  Placenta appearance: Intact  Placenta disposition: Discarded           Labor Events:       labor: No     Labor Onset Date/Time:         Dilation Complete Date/Time:         Start Pushing Date/Time:         Start Pushing Date/Time:       Rupture Date/Time: 24 0700        Rupture type: SRM (Spontaneous Rupture)        Fluid Amount:       Fluid Color: Clear              steroids: None     Antibiotics given for GBS: No     Induction:       Indications for induction:        Augmentation:       Indications for augmentation:       Labor complications: None     Additional complications:          Cervical  ripening:                     Delivery:      Episiotomy: None     Indication for Episiotomy:       Perineal Lacerations: 2nd Repaired:  Yes   Periurethral Laceration:   Repaired:     Labial Laceration: left Repaired: No   Sulcus Laceration:   Repaired:     Vaginal Laceration:   Repaired:     Cervical Laceration:   Repaired:     Repair suture:       Repair # of packets: 1     Last Value - EBL - Nursing (mL):       Sum - EBL - Nursing (mL): 0     Last Value - EBL - Anesthesia (mL):      Calculated QBL (mL): 196     Running total QBL (mL): 196     Vaginal Sweep Performed: Yes     Surgicount Correct: Yes     Vaginal Packing: No Quantity:       Other providers:       Anesthesia    Method: None          Details (if applicable):  Trial of Labor      Categorization:      Priority:     Indications for :     Incision Type:       Additional  information:  Forceps:    Vacuum:    Breech:    Observed anomalies    Other (Comments): Lidocaine administered per CNM after delivery for repair - see MAR.

## 2024-11-02 NOTE — CARE UPDATE
I went to bedside to check on patient and debrief about operative vaginal delivery. Patient resting comfortably in bed, infant in arms and latching to breastfeed. I discussed the indication for vacuum delivery (fetal distress) and answered their questions to patient satisfaction.     Margot Diamond MD   [facility]

## 2024-11-02 NOTE — PROGRESS NOTES
Vital signs stable.   Pain controlled: IV Toradol at delivery. No pain noted at this time.  Feeding: BF   Bleeding: Fundus firm, midline, with light-moderate lochia rubra  : 2 degree with repair. Left labial without repair.   Void: unable to void after delivery.   No concerns at this time  Transporting pt to [facility] @ 1115.

## 2024-11-02 NOTE — PLAN OF CARE
Flat nipples. Uncoordinated suck swallow. Hand expression performed and educated on hand expression.   Problem: Breastfeeding  Goal: Effective Breastfeeding  11/2/2024 1126 by Yobani Gomez, RN  Outcome: Progressing  11/2/2024 1126 by Yobani Gomez, RN  Outcome: Not Progressing

## 2024-11-02 NOTE — PLAN OF CARE
Pt will identify early hunger cues and respond.   Pt will breastfeed baby on cue, or about 8 or more in 24 hours.  Pt will observe adequate milk transfer- audible/ visual swallows, enough wet and dirty diaper, satisfaction cues and hydration status.   Pt will give expressed breast milk if latch is not yet fully efficient.   Pt will call for further help as needed-

## 2024-11-02 NOTE — L&D DELIVERY NOTE
MD called to bedside by CN and Midwife at 0817 to assist with operative delivery given FHT in 80s x 5 minutes. On my arrival in room, FHT remained in 80s, fetal head POWER and +2 station. Patient and  counseled on vacuum and it's indication in the setting of fetal distress and verbal consent given. Kiwi vacuum applied. With two contractions, two pop offs were noted. With third contraction, Kiwi reapplied and with three pulls and no popoffs, fetal head delivered, vacuum removed. Remainder of fetal body delivered without difficulty at 0822. Fetus with good tone and cry immediately upon delivery.     At this time, cord was clamped and cut and infant handed off to awaiting NICU team for evaluation. At this time, remainder of care was returned to the midwife team.     Margot Diamond MD   Guardian Hospitalist

## 2024-11-02 NOTE — PLAN OF CARE
Problem: Diabetes in Pregnancy  Goal: Optimal Coping  Reactivated  Goal: Blood Glucose Level Within Targeted Range  Reactivated     Problem:  Fall Injury Risk  Goal: Absence of Fall, Infant Drop and Related Injury  Reactivated     Problem: Adult Inpatient Plan of Care  Goal: Plan of Care Review  Reactivated  Goal: Patient-Specific Goal (Individualized)  Reactivated  Goal: Absence of Hospital-Acquired Illness or Injury  Reactivated  Goal: Optimal Comfort and Wellbeing  Reactivated  Goal: Readiness for Transition of Care  Reactivated     Problem: Infection  Goal: Absence of Infection Signs and Symptoms  Reactivated     Problem: Postpartum (Vaginal Delivery)  Goal: Successful Parent Role Transition  Reactivated  Goal: Hemostasis  Reactivated  Goal: Absence of Infection Signs and Symptoms  Reactivated  Goal: Anesthesia/Sedation Recovery  Reactivated  Goal: Optimal Pain Control and Function  Reactivated  Goal: Effective Urinary Elimination  Reactivated     VSS. Natural delivery. No pain noted at this time. Plan of care reviewed with patient and family.

## 2024-11-03 PROBLEM — Z34.90 PREGNANCY: Status: RESOLVED | Noted: 2024-09-10 | Resolved: 2024-11-03

## 2024-11-03 PROBLEM — Z37.9 NORMAL LABOR: Status: RESOLVED | Noted: 2024-11-02 | Resolved: 2024-11-03

## 2024-11-03 PROBLEM — U07.1 COVID-19 AFFECTING PREGNANCY IN THIRD TRIMESTER: Status: RESOLVED | Noted: 2024-09-10 | Resolved: 2024-11-03

## 2024-11-03 PROBLEM — O98.513 COVID-19 AFFECTING PREGNANCY IN THIRD TRIMESTER: Status: RESOLVED | Noted: 2024-09-10 | Resolved: 2024-11-03

## 2024-11-03 LAB
BASOPHILS # BLD AUTO: 0.01 K/UL (ref 0–0.2)
BASOPHILS NFR BLD: 0.1 % (ref 0–1.9)
DIFFERENTIAL METHOD BLD: ABNORMAL
EOSINOPHIL # BLD AUTO: 0 K/UL (ref 0–0.5)
EOSINOPHIL NFR BLD: 0.4 % (ref 0–8)
ERYTHROCYTE [DISTWIDTH] IN BLOOD BY AUTOMATED COUNT: 12.9 % (ref 11.5–14.5)
HCT VFR BLD AUTO: 33.3 % (ref 37–48.5)
HGB BLD-MCNC: 11.3 G/DL (ref 12–16)
IMM GRANULOCYTES # BLD AUTO: 0.05 K/UL (ref 0–0.04)
IMM GRANULOCYTES NFR BLD AUTO: 0.4 % (ref 0–0.5)
LYMPHOCYTES # BLD AUTO: 1.3 K/UL (ref 1–4.8)
LYMPHOCYTES NFR BLD: 11.9 % (ref 18–48)
MCH RBC QN AUTO: 32.5 PG (ref 27–31)
MCHC RBC AUTO-ENTMCNC: 33.9 G/DL (ref 32–36)
MCV RBC AUTO: 96 FL (ref 82–98)
MONOCYTES # BLD AUTO: 0.5 K/UL (ref 0.3–1)
MONOCYTES NFR BLD: 4.2 % (ref 4–15)
NEUTROPHILS # BLD AUTO: 9.4 K/UL (ref 1.8–7.7)
NEUTROPHILS NFR BLD: 83 % (ref 38–73)
NRBC BLD-RTO: 0 /100 WBC
PLATELET # BLD AUTO: 119 K/UL (ref 150–450)
PMV BLD AUTO: 11.9 FL (ref 9.2–12.9)
POCT GLUCOSE: 105 MG/DL (ref 70–110)
RBC # BLD AUTO: 3.48 M/UL (ref 4–5.4)
WBC # BLD AUTO: 11.27 K/UL (ref 3.9–12.7)

## 2024-11-03 PROCEDURE — 25000003 PHARM REV CODE 250: Performed by: ADVANCED PRACTICE MIDWIFE

## 2024-11-03 PROCEDURE — 85025 COMPLETE CBC W/AUTO DIFF WBC: CPT | Performed by: OBSTETRICS & GYNECOLOGY

## 2024-11-03 PROCEDURE — 11000001 HC ACUTE MED/SURG PRIVATE ROOM

## 2024-11-03 PROCEDURE — 36415 COLL VENOUS BLD VENIPUNCTURE: CPT | Performed by: OBSTETRICS & GYNECOLOGY

## 2024-11-03 RX ORDER — DOCUSATE SODIUM 100 MG/1
200 CAPSULE, LIQUID FILLED ORAL 2 TIMES DAILY PRN
Qty: 14 CAPSULE | Refills: 0 | Status: SHIPPED | OUTPATIENT
Start: 2024-11-03

## 2024-11-03 RX ORDER — IBUPROFEN 600 MG/1
600 TABLET ORAL EVERY 6 HOURS PRN
Qty: 60 TABLET | Refills: 0 | Status: SHIPPED | OUTPATIENT
Start: 2024-11-03

## 2024-11-03 RX ORDER — ACETAMINOPHEN 325 MG/1
650 TABLET ORAL EVERY 6 HOURS
Qty: 60 TABLET | Refills: 0 | Status: SHIPPED | OUTPATIENT
Start: 2024-11-03

## 2024-11-03 RX ADMIN — DOCUSATE SODIUM 200 MG: 100 CAPSULE, LIQUID FILLED ORAL at 08:11

## 2024-11-03 RX ADMIN — ACETAMINOPHEN 650 MG: 325 TABLET, FILM COATED ORAL at 06:11

## 2024-11-03 RX ADMIN — IBUPROFEN 600 MG: 600 TABLET, FILM COATED ORAL at 09:11

## 2024-11-03 RX ADMIN — ACETAMINOPHEN 650 MG: 325 TABLET, FILM COATED ORAL at 01:11

## 2024-11-03 RX ADMIN — IBUPROFEN 600 MG: 600 TABLET, FILM COATED ORAL at 04:11

## 2024-11-03 RX ADMIN — ACETAMINOPHEN 650 MG: 325 TABLET, FILM COATED ORAL at 04:11

## 2024-11-03 RX ADMIN — ACETAMINOPHEN 650 MG: 325 TABLET, FILM COATED ORAL at 11:11

## 2024-11-03 RX ADMIN — IBUPROFEN 600 MG: 600 TABLET, FILM COATED ORAL at 11:11

## 2024-11-03 RX ADMIN — ACETAMINOPHEN 650 MG: 325 TABLET, FILM COATED ORAL at 09:11

## 2024-11-03 RX ADMIN — PRENATAL VIT W/ FE FUMARATE-FA TAB 27-0.8 MG 1 TABLET: 27-0.8 TAB at 08:11

## 2024-11-03 NOTE — DISCHARGE SUMMARY
[facility] ([place])  Obstetrics  Discharge Summary      Patient Name: Adina Jenkins  MRN: [id number]  Admission Date: 11/2/2024  Hospital Length of Stay: 1 days  Discharge Date and Time:  11/03/2024 9:51 AM  Attending Physician: Stacy Juan DO   Discharging Provider: Yajaira Ewing CNM   Primary Care Provider: Sarah Beth, Primary Doctor    HPI: PT admitted from [facility]/+2. Pt reports she started having ctx at 0700 and felt the urge to have a BM, then had SROM, clear fluid. Transferred to [facility] where FHR were in the 80's, pt unable to delivery baby with strong efforts, verbal consent given from pt to do a vacuum extraction, Dr Diamond called to BS, vacuum placed per Dr Diamond and baby delivered, vigorous and crying. NICU at  for del. After del of the placenta , IM Pitocin was administer and repair completed. Uterus palpates boggy with no bleeding noted, Cytotec 800 mcg pr administered.   This pregnancy has been complicated by GDM no meds, stable blood sugars throughout the pregnancy.          * No surgery found *     Hospital Course:   11/2/24 PT admitted from [facility]/+2. Pt reports she started having ctx at 0700 and felt the urge to have a BM, then had SROM, clear fluid. Transferred to [facility] where FHR were in the 80's, pt unable to delivery baby with strong efforts, verbal consent given from pt to do a vacuum extraction, Dr Diamond called to BS, vacuum placed per Dr Diamond and baby delivered, vigorous and crying. NICU at BS for del. After del of the placenta , IM Pitocin was administer and repair completed. Uterus palpates boggy with no bleeding noted, Cytotec 800 mcg pr administered.     11/3/2024- PPD#1- Pt doing well, normal involution. Breastfeeding well, has seen lactation. Desires to d/c home today.     No new subjective & objective note has been filed under this hospital service since the last note was generated.       Final Active Diagnoses:    Diagnosis Date Noted POA    PRINCIPAL PROBLEM:   "Status post vacuum-assisted vaginal delivery [Z87.59] 2024 Not Applicable    Breast feeding status of mother [Z39.1] 2024 Not Applicable     Chronic      Problems Resolved During this Admission:        Significant Diagnostic Studies: Labs: CBC   Recent Labs   Lab 24  0920 24  0650   WBC 11.30 11.27   HGB 13.3 11.3*   HCT 38.9 33.3*   * 119*    and All labs within the past 24 hours have been reviewed      Feeding Method: breast    Immunizations       Date Immunization Status Dose Route/Site Given by    24 0949 Rsv, Bivalent, Rsvpref (Abrysvo) Deleted       24 1045 MMR Incomplete 0.5 mL Subcutaneous/     24 1045 Tdap Incomplete 0.5 mL Intramuscular/             Delivery:    Episiotomy: None   Lacerations: 2nd   Repair suture:     Repair # of packets: 1   Blood loss (ml):       Birth information:  YOB: 2025   Time of birth: 8:22 AM   Sex: male   Delivery type: Vaginal, Spontaneous   Gestational Age: 39w4d     Measurements    Weight: 3320 g  Weight (lbs): 7 lb 5.1 oz  Length: 52.5 cm  Length (in): 20.67"  Head circumference: 33.5 cm  Chest circumference: 32 cm         Delivery Clinician: Delivery Providers    Delivering clinician: Margot Diamond MD   Provider Role    Khalida Billy CNM Midwife    Tianna Bello RN Charge Nurse    Monisha Johnson RN Delivery Nurse    Yobani Gomez RN Registered Nurse    [facility], RN Registered Nurse             Additional  information:  Forceps:    Vacuum:    Breech:    Observed anomalies      Living?:     Apgars    Living status: Living  Apgar Component Scores:  1 min.:  5 min.:  10 min.:  15 min.:  20 min.:    Skin color:  0  1       Heart rate:  2  2       Reflex irritability:  2  2       Muscle tone:  2  2       Respiratory effort:  2  2       Total:  8  9       Apgars assigned by: NICU         Placenta: Delivered:       appearance  Pending Diagnostic Studies: "       None            Discharged Condition: stable    Disposition: Home or Self Care    Follow Up:   Follow-up Information       [facility] week([facility]    Specialty: Obstetrics and Gynecology  Why: for routine postpartum visit  Contact information:  [address] 4th Floor  [facility] [address] [phone]  Additional information:  Turn at Entrance 1 on [facility] in [facility] and take elevators to Floor 2. Follow signs to Hospital. Take Hospital Elevators to Floor 4 for [address].                         Patient Instructions:      Diet Adult Regular     Lifting restrictions     Pelvic Rest     Notify your health care provider if you experience any of the following:  temperature >100.4     Notify your health care provider if you experience any of the following:  persistent nausea and vomiting or diarrhea     Notify your health care provider if you experience any of the following:  severe uncontrolled pain     Notify your health care provider if you experience any of the following:  redness, tenderness, or signs of infection (pain, swelling, redness, odor or green/yellow discharge around incision site)     Notify your health care provider if you experience any of the following:  difficulty breathing or increased cough     Notify your health care provider if you experience any of the following:  severe persistent headache     Notify your health care provider if you experience any of the following:  worsening rash     Notify your health care provider if you experience any of the following:  persistent dizziness, light-headedness, or visual disturbances     Notify your health care provider if you experience any of the following:  increased confusion or weakness     Medications:  Current Discharge Medication List        START taking these medications    Details   acetaminophen (TYLENOL) 325 MG tablet Take 2 tablets  (650 mg total) by mouth every 6 (six) hours.  Qty: 60 tablet, Refills: 0      docusate sodium (COLACE) 100 MG capsule Take 2 capsules (200 mg total) by mouth 2 (two) times daily as needed for Constipation.  Qty: 14 capsule, Refills: 0      ibuprofen (ADVIL,MOTRIN) 600 MG tablet Take 1 tablet (600 mg total) by mouth every 6 (six) hours as needed for Pain.  Qty: 60 tablet, Refills: 0           CONTINUE these medications which have NOT CHANGED    Details   prenat.vits,jak,min-iron-folic Tab Take by mouth.           STOP taking these medications       blood sugar diagnostic (TRUE METRIX GLUCOSE TEST STRIP) Strp Comments:   Reason for Stopping:         blood sugar diagnostic Strp Comments:   Reason for Stopping:         blood-glucose meter kit Comments:   Reason for Stopping:         lancets Misc Comments:   Reason for Stopping:             Follow Up/Patient Instructions:   1. Reviewed postpartum recommendations and precautions ~ encouraged to call for fever, severe or increased pain in head, chest, abdomen, perineum or legs; heavy bleeding, foul smelling lochia, signs of depression, or any other concern. Reviewed postpartum precautions r/t high blood pressure ~ encouraged to call for HA, vision changes, epigastric discomfort, or abnormal swelling.  2. Rx provided: see above  3. Contraception: considering NFP; will f/u at postpartum visit. Encouraged abstinence and pelvic rest for healing until after postpartum check-up.   4. Encouraged to continue PNV while breastfeeding or at least for 6 weeks postpartum.   5. Car seat law will be reviewed with patient at discharge per protocol  6. RTO in 4-6 weeks or sooner prn.  7. Discharge home today with written and verbal postpartum instructions and precautions.      EMEKA Acosta CNM  Obstetrics  [facility] ([place])

## 2024-11-03 NOTE — ASSESSMENT & PLAN NOTE
11/03/2024- Breastfeeding going well, has seen lactation but hopes to see them once more before discharge today.

## 2024-11-03 NOTE — HPI
PT admitted from [facility]/+2. Pt reports she started having ctx at 0700 and felt the urge to have a BM, then had SROM, clear fluid. Transferred to [facility] where FHR were in the 80's, pt unable to delivery baby with strong efforts, verbal consent given from pt to do a vacuum extraction, Dr Diamond called to BS, vacuum placed per Dr Diamond and baby delivered, vigorous and crying. NICU at  for del. After del of the placenta , IM Pitocin was administer and repair completed. Uterus palpates boggy with no bleeding noted, Cytotec 800 mcg pr administered.   This pregnancy has been complicated by GDM no meds, stable blood sugars throughout the pregnancy.

## 2024-11-03 NOTE — LACTATION NOTE
Lactation visited assisted with feeding. Baby placed in football on the left breast. Left nipple retracts but everts with stimulation. Baby would latch but would not sustain a sucking pattern. Baby clamped down with his jaw. Baby eventually took about 3 sucks and continued with smacking and suck dimples. Baby removed fro the breast and right breast attempted. Baby latched after several attempts with a few minutes of good sucks using breast compression to help keep him feeding. Occasionally he would have suck dimples and make smacking sounds. Baby removed and re-latched. A few swallows heard when he was deeply latched. Pt educated on the Symphony pump for stimulation. Pt to attempt to latch the baby 8 or more times in 24hrs on cue until content. If baby does not effectively latch, pt to hand expressed and/or offer EBM/ donor milk, then pump for stimulation. Pt verbalized understanding of breastfeeding plan. Breastfeeding discharge education reviewed with pt via breastfeeding guide.    11/03/24 1400   Maternal Assessment   Breast Shape Bilateral:;round   Breast Density Bilateral:;soft   Areola Bilateral:;elastic   Nipples Left:;retracting;Right:;everted;short   Maternal Infant Feeding   Maternal Emotional State assist needed   Infant Positioning clutch/football   Signs of Milk Transfer infant jaw motion present;audible swallow;other (see comments)  (With max help breast compression and stimulation on right breast for a few min)   Pain with Feeding yes   Pain Location nipples, bilateral   Pain Description soreness  (pinching)   Comfort Measures Before/During Feeding infant position adjusted;latch adjusted;maternal position adjusted   Nipple Shape After Feeding, Left everted   Nipple Shape After Feeding, Right everted   Latch Assistance yes   Equipment Type   Breast Pump Type double electric, hospital grade   Breast Pump Flange Type hard   Breast Pump Flange Size 21 mm   Breast Pumping   Breast Pumping Interventions  post-feed pumping encouraged

## 2024-11-03 NOTE — PLAN OF CARE
VSS. Patient ambulating and voiding. Fundus firm, midline, with moderate lochia. Pain controlled with oral pain medications. Bonding appropriately with infant at bedside. Attentive to infant cues. RH&H @0430. No additional information at this time.

## 2024-11-03 NOTE — ASSESSMENT & PLAN NOTE
11/03/24- PPD #1 approptiate involution and lochia. Normal postpartum care and advances. Desires d/c home today.

## 2024-11-03 NOTE — SUBJECTIVE & OBJECTIVE
Interval History:     Doing well, ambulating, voiding, and tolerating regular diet  Denies dizziness or light headed sensation. Denies SOB or difficulty breathing.   Denies headaches, visual disturbances or upper GI pain, nausea, or vomiting.  Reports passing flatus.   Lochia: steadily decreasing  Pain: well controlled requiring PO medication  Breasts/nipples: breastfeeding well with minimal difficulty; has seen lactation  Depression/anxiety: denies   Support at home: yes  Contraception: considering NFP; understands that progesterone only options are appropriate with breastfeeding  : male  is doing well, will f/u with pediatrician.       Objective:     Vital Signs (Most Recent):  Temp: 97.8 °F (36.6 °C) (24)  Pulse: 75 (24)  Resp: 18 (24)  BP: 102/70 (24)  SpO2: 98 % (24) Vital Signs (24h Range):  Temp:  [97.8 °F (36.6 °C)-98.6 °F (37 °C)] 97.8 °F (36.6 °C)  Pulse:  [71-86] 75  Resp:  [18] 18  SpO2:  [96 %-100 %] 98 %  BP: ()/(55-76) 102/70     Weight: 78.5 kg (173 lb 2.7 oz)  Body mass index is 28.82 kg/m².      Intake/Output Summary (Last 24 hours) at 11/3/2024 0937  Last data filed at 2024 1835  Gross per 24 hour   Intake --   Output 1250 ml   Net -1250 ml         Significant Labs:    CBC:   Recent Labs   Lab 24  0650   WBC 11.27   RBC 3.48*   HGB 11.3*   HCT 33.3*   *   MCV 96   MCH 32.5*   MCHC 33.9     I have personallly reviewed all pertinent lab results from the last 24 hours.    Physical Exam  Gen: A&O x 4, NAD  CV: normal HR  Lungs: normal resp effort  Breasts: bilaterally soft, non-tender, nipples intact bilaterally  Abdomen: soft, non-tender, uterus firm at U - 1 fb  Perineum: approximated, no edema   Lochia: minimal rubra  Ext: bilaterally no pedal edema, without signs of DVT

## 2024-11-04 VITALS
TEMPERATURE: 99 F | HEIGHT: 65 IN | OXYGEN SATURATION: 100 % | HEART RATE: 89 BPM | SYSTOLIC BLOOD PRESSURE: 109 MMHG | WEIGHT: 173.19 LBS | DIASTOLIC BLOOD PRESSURE: 71 MMHG | RESPIRATION RATE: 18 BRPM | BODY MASS INDEX: 28.86 KG/M2

## 2024-11-04 LAB — GLUCOSE SERPL-MCNC: 76 MG/DL (ref 70–110)

## 2024-11-04 PROCEDURE — 25000003 PHARM REV CODE 250: Performed by: ADVANCED PRACTICE MIDWIFE

## 2024-11-04 PROCEDURE — 82947 ASSAY GLUCOSE BLOOD QUANT: CPT

## 2024-11-04 PROCEDURE — 36415 COLL VENOUS BLD VENIPUNCTURE: CPT

## 2024-11-04 RX ADMIN — ACETAMINOPHEN 650 MG: 325 TABLET, FILM COATED ORAL at 11:11

## 2024-11-04 RX ADMIN — PRENATAL VIT W/ FE FUMARATE-FA TAB 27-0.8 MG 1 TABLET: 27-0.8 TAB at 08:11

## 2024-11-04 RX ADMIN — ACETAMINOPHEN 650 MG: 325 TABLET, FILM COATED ORAL at 03:11

## 2024-11-04 RX ADMIN — IBUPROFEN 600 MG: 600 TABLET, FILM COATED ORAL at 03:11

## 2024-11-04 RX ADMIN — DOCUSATE SODIUM 200 MG: 100 CAPSULE, LIQUID FILLED ORAL at 08:11

## 2024-11-04 RX ADMIN — IBUPROFEN 600 MG: 600 TABLET, FILM COATED ORAL at 08:11

## 2024-11-04 NOTE — DISCHARGE SUMMARY
[facility] ([place])  Obstetrics  Discharge Summary      Patient Name: Adina Jenkins  MRN: [id number]  Admission Date: 11/2/2024  Hospital Length of Stay: 2 days  Discharge Date and Time:  11/04/2024 9:23 AM  Attending Physician: Stacy Juan DO   Discharging Provider: Quita Vasquez CNM   Primary Care Provider: Sarah Beth, Primary Doctor    HPI: PT admitted from [facility]/+2. Pt reports she started having ctx at 0700 and felt the urge to have a BM, then had SROM, clear fluid. Transferred to [facility] where FHR were in the 80's, pt unable to delivery baby with strong efforts, verbal consent given from pt to do a vacuum extraction, Dr Diamond called to BS, vacuum placed per Dr Diamond and baby delivered, vigorous and crying. NICU at  for del. After del of the placenta , IM Pitocin was administer and repair completed. Uterus palpates boggy with no bleeding noted, Cytotec 800 mcg pr administered.   This pregnancy has been complicated by GDM no meds, stable blood sugars throughout the pregnancy.          * No surgery found *     Hospital Course:   11/2/24 PT admitted from [facility]/+2. Pt reports she started having ctx at 0700 and felt the urge to have a BM, then had SROM, clear fluid. Transferred to [facility] where FHR were in the 80's, pt unable to delivery baby with strong efforts, verbal consent given from pt to do a vacuum extraction, Dr Diamond called to BS, vacuum placed per Dr Diamond and baby delivered, vigorous and crying. NICU at BS for del. After del of the placenta , IM Pitocin was administer and repair completed. Uterus palpates boggy with no bleeding noted, Cytotec 800 mcg pr administered.     11/3/2024- PPD#1- Pt doing well, normal involution. Breastfeeding well, has seen lactation. Desires to d/c home today.    11/4/24: PPD2, doing well, normal involution, desires d/ home today, f/u x 6 weeks or sooner PRN         Doing well, ambulating, voiding, and tolerating regular diet  Denies dizziness or light headed  "sensation. Denies SOB or difficulty breathing.   Denies headaches, visual disturbances or upper GI pain, nausea, or vomiting.  Reports passing flatus and had bowel movement  Lochia: steadily decreasing  Pain: well controlled with ibuprofen  Breasts/nipples: feeding well without difficulty  Depression/anxiety: denies   Support at home: good  Contraception: to discuss @ PP visit  : baby BOY is doing well, will f/u with pediatrician.     Gen: A&O x 4, NAD  CV: normal HR  Lungs: normal resp effort  Breasts: bilaterally soft, non-tender, nipples intact   Abdomen: soft, non-tender, uterus firm at U - 2 fb  Perineum: approximated, no edema   Lochia: minimal rubra  Ext: bilaterally trace pedal edema, without signs of DVT   Final Active Diagnoses:    Diagnosis Date Noted POA    PRINCIPAL PROBLEM:  Status post vacuum-assisted vaginal delivery [Z87.59] 2024 Not Applicable    Breast feeding status of mother [Z39.1] 2024 Not Applicable     Chronic      Problems Resolved During this Admission:        Significant Diagnostic Studies: Labs: All labs within the past 24 hours have been reviewed      Feeding Method: breast    Immunizations       Date Immunization Status Dose Route/Site Given by    24 0949 Rsv, Bivalent, Rsvpref (Abrysvo) Deleted       24 1045 MMR Incomplete 0.5 mL Subcutaneous/     24 1045 Tdap Incomplete 0.5 mL Intramuscular/             Delivery:    Episiotomy: None   Lacerations: 2nd   Repair suture:     Repair # of packets: 1   Blood loss (ml):       Birth information:  YOB: 2025   Time of birth: 8:22 AM   Sex: male   Delivery type: Vaginal, Spontaneous   Gestational Age: 39w4d     Measurements    Weight: 3320 g  Weight (lbs): 7 lb 5.1 oz  Length: 52.5 cm  Length (in): 20.67"  Head circumference: 33.5 cm  Chest circumference: 32 cm         Delivery Clinician: Delivery Providers    Delivering clinician: Margot Diamond MD   Provider Role    Khalida Billy CNM " Midwife    Tianna Bello, RN Charge Nurse    Monisha Johnson RN Delivery Nurse    Yobani Gomez RN Registered Nurse    Deisy Pathak [facility]    Ann Fagan, RN Registered Nurse             Additional  information:  Forceps:    Vacuum:    Breech:    Observed anomalies      Living?:     Apgars    Living status: Living  Apgar Component Scores:  1 min.:  5 min.:  10 min.:  15 min.:  20 min.:    Skin color:  0  1       Heart rate:  2  2       Reflex irritability:  2  2       Muscle tone:  2  2       Respiratory effort:  2  2       Total:  8  9       Apgars assigned by: NICU         Placenta: Delivered:       appearance  Pending Diagnostic Studies:       None            Discharged Condition: stable    Disposition: Home or Self Care    Follow Up:   Follow-up Information       [facility] Follow up in 6 week(s).    Specialty: Obstetrics and Gynecology  Why: for routine postpartum visit  Contact information:  [address] 4th Floor  [facility] [address] [phone]  Additional information:  Turn at Entrance 1 on [facility] in [facility] and take elevators to Floor 2. Follow signs to Hospital. Take Hospital Elevators to Floor 4 for [address].                         Patient Instructions:      Diet Adult Regular     Diet Adult Regular     Lifting restrictions     Pelvic Rest     Notify your health care provider if you experience any of the following:  temperature >100.4     Notify your health care provider if you experience any of the following:  persistent nausea and vomiting or diarrhea     Notify your health care provider if you experience any of the following:  severe uncontrolled pain     Notify your health care provider if you experience any of the following:  redness, tenderness, or signs of infection (pain, swelling, redness, odor or green/yellow discharge around incision site)     Notify your health  care provider if you experience any of the following:  difficulty breathing or increased cough     Notify your health care provider if you experience any of the following:  severe persistent headache     Notify your health care provider if you experience any of the following:  worsening rash     Notify your health care provider if you experience any of the following:  persistent dizziness, light-headedness, or visual disturbances     Notify your health care provider if you experience any of the following:  increased confusion or weakness     Ice to affected area     Lifting restrictions     Pelvic Rest     Notify your health care provider if you experience any of the following:  temperature >100.4     Notify your health care provider if you experience any of the following:  persistent nausea and vomiting or diarrhea     Notify your health care provider if you experience any of the following:  severe uncontrolled pain     Notify your health care provider if you experience any of the following:  redness, tenderness, or signs of infection (pain, swelling, redness, odor or green/yellow discharge around incision site)     Notify your health care provider if you experience any of the following:  difficulty breathing or increased cough     Notify your health care provider if you experience any of the following:  severe persistent headache     Notify your health care provider if you experience any of the following:  worsening rash     Notify your health care provider if you experience any of the following:  persistent dizziness, light-headedness, or visual disturbances     Notify your health care provider if you experience any of the following:  increased confusion or weakness     Activity as tolerated     Medications:  Current Discharge Medication List        START taking these medications    Details   acetaminophen (TYLENOL) 325 MG tablet Take 2 tablets (650 mg total) by mouth every 6 (six) hours.  Qty: 60 tablet,  Refills: 0      docusate sodium (COLACE) 100 MG capsule Take 2 capsules (200 mg total) by mouth 2 (two) times daily as needed for Constipation.  Qty: 14 capsule, Refills: 0      ibuprofen (ADVIL,MOTRIN) 600 MG tablet Take 1 tablet (600 mg total) by mouth every 6 (six) hours as needed for Pain.  Qty: 60 tablet, Refills: 0           CONTINUE these medications which have NOT CHANGED    Details   prenat.vits,jak,min-iron-folic Tab Take by mouth.           STOP taking these medications       blood sugar diagnostic (TRUE METRIX GLUCOSE TEST STRIP) Strp Comments:   Reason for Stopping:         blood sugar diagnostic Strp Comments:   Reason for Stopping:         blood-glucose meter kit Comments:   Reason for Stopping:         lancets Misc Comments:   Reason for Stopping:               Quita Vasquez CNM  Obstetrics  [facility] ([place])

## 2024-11-04 NOTE — LACTATION NOTE
11/04/24 0850   Maternal Assessment   Breast Shape Bilateral:;round   Breast Density Bilateral:;filling   Areola Bilateral:;elastic   Nipples Left:;flat;Right:;everted   Left Nipple Symptoms tender   Right Nipple Symptoms tender   Maternal Infant Feeding   Maternal Emotional State independent  (indpend on right, assistance needed on left)   Infant Positioning clutch/football   Signs of Milk Transfer audible swallow;infant jaw motion present   Pain with Feeding no   Nipple Shape After Feeding, Left everted   Nipple Shape After Feeding, Right everted   Equipment Type   Breast Pump Type double electric, hospital grade   Breast Pump Flange Type hard   Breast Pump Flange Size 21 mm   Breast Pumping   Breast Pumping Interventions post-feed pumping encouraged   Community Referrals   Community Referrals outpatient lactation program;support group     Pt reports baby is doing better with breastfeeding. Pt attempting to latch baby to left breast. Nipple protruding (pt wearing breast shell between feedings). Several latch attempts with only a few pulls but not a sustainable efficient latch achieved. Pt moved baby to right breast. Pt very upset. Baby finger fed colostrum 2mls by syringe. Pt able to latch baby to right breast independently.  Tugs and pulls observed. Baby needed stimulation to keep actively breastfeeding.swallows audible.  Baby moved to left breast. A few latches were achieved but not sustained. Nipple shield utilized. Baby latched and sucked longer (5minutes) but breast compression and stimulation needed.  Pt lactation contact number and community resources.    Plan recommended: pt to breastfeed on cue at least 8 or more times daily.  Pt to offer both breast. If baby only latching and breastfeeding on right breast, pt will pump left breast for 15 minutes. If baby is able to breastfeed both breast, pt to pump at least 2-3 times daily for 10-15 minutes until baby is 14 days old for extra breast stimulation.  Pt  to observe for signs of breastmilk transfer.

## 2024-11-05 ENCOUNTER — PATIENT MESSAGE (OUTPATIENT)
Dept: OBSTETRICS AND GYNECOLOGY | Facility: OTHER | Age: 29
End: 2024-11-05
Payer: COMMERCIAL

## 2024-11-23 ENCOUNTER — PATIENT MESSAGE (OUTPATIENT)
Dept: OBSTETRICS AND GYNECOLOGY | Facility: CLINIC | Age: 29
End: 2024-11-23
Payer: COMMERCIAL

## 2024-12-20 ENCOUNTER — POSTPARTUM VISIT (OUTPATIENT)
Dept: OBSTETRICS AND GYNECOLOGY | Facility: CLINIC | Age: 29
End: 2024-12-20
Payer: COMMERCIAL

## 2024-12-20 VITALS
HEIGHT: 65 IN | DIASTOLIC BLOOD PRESSURE: 80 MMHG | SYSTOLIC BLOOD PRESSURE: 115 MMHG | BODY MASS INDEX: 27.2 KG/M2 | WEIGHT: 163.25 LBS

## 2024-12-20 DIAGNOSIS — N81.89 PELVIC FLOOR WEAKNESS: ICD-10-CM

## 2024-12-20 PROCEDURE — 99999 PR PBB SHADOW E&M-EST. PATIENT-LVL III: CPT | Mod: PBBFAC,,, | Performed by: ADVANCED PRACTICE MIDWIFE

## 2024-12-20 NOTE — PROGRESS NOTES
CC: Post-partum follow-up    Adina Jenkins is a 29 y.o. female  presents for a postpartum visit.  She is status post  Vacuum extraction 6 weeks ago.  Her hospitalization was not complicated.  She is breastfeeding.  She desires no method for contraception.  She denies postpartum depression. EPDS score:4.  She and the baby are doing well.  No pain.  No fever.   No bowel / bladder complaints. Pregnancy was complicated by: GDM. Discussed BCM, Phexxi, IUD and POP. She will consider and contact us. SERB discussed and perfect use.     Delivery Date: 2024  Delivery MD: myself/dr robles   Depression: YES  Contraception: no method      Her last pap was 10/13/2022 discussed the need for WWE in 10/25 PAP hx NILM       ROS:  GENERAL: No fever, chills, fatigability.  VULVAR: No pain, no lesions and no itching.  VAGINAL: No relaxation, no itching, no discharge, no abnormal bleeding and no lesions.  ABDOMEN: No abdominal pain. Denies nausea. Denies vomiting. No diarrhea. No constipation  BREAST: Denies pain. No lumps. No discharge.  URINARY: No incontinence, no nocturia, no frequency and no dysuria.  CARDIOVASCULAR: No chest pain. No shortness of breath. No leg cramps.  NEUROLOGICAL: No headaches. No vision changes.    Past Medical History:   Diagnosis Date    Abnormal Pap smear of cervix     required repeat pap and normal, +HPV (garsasil vaccinated)    Anxiety disorder, unspecified     seeing therapist    Gestational diabetes     Irregular menstrual cycle 2023     History reviewed. No pertinent surgical history.  Review of patient's allergies indicates:  No Known Allergies    Current Outpatient Medications:     acetaminophen (TYLENOL) 325 MG tablet, Take 2 tablets (650 mg total) by mouth every 6 (six) hours., Disp: 60 tablet, Rfl: 0    docusate sodium (COLACE) 100 MG capsule, Take 2 capsules (200 mg total) by mouth 2 (two) times daily as needed for Constipation., Disp: 14 capsule, Rfl: 0    ibuprofen  (ADVIL,MOTRIN) 600 MG tablet, Take 1 tablet (600 mg total) by mouth every 6 (six) hours as needed for Pain., Disp: 60 tablet, Rfl: 0    prenat.vits,jak,min-iron-folic Tab, Take by mouth., Disp: , Rfl:       Vitals:    12/20/24 1100   BP: 115/80         EXTERNAL GENITALIA POSTPARTUM: normal, well-healed, without lesions or masses  Vaginal tissue weakness noted, no prolapse. Will refer to PFPT.   Assessment:    1. Vaginal tissue weakness  2. Doing well S/P Vacuum extraction      Plan:    1. Routine follow up.  2. Instructions / precautions reviewed  3. Contraceptive counseling  4. May resume normal activities  5. Return: 10/25 CC: Post-partum follow-up

## 2025-01-13 ENCOUNTER — PROCEDURE VISIT (OUTPATIENT)
Dept: OBSTETRICS AND GYNECOLOGY | Facility: CLINIC | Age: 30
End: 2025-01-13
Payer: COMMERCIAL

## 2025-01-13 VITALS
SYSTOLIC BLOOD PRESSURE: 116 MMHG | WEIGHT: 147.69 LBS | DIASTOLIC BLOOD PRESSURE: 66 MMHG | HEART RATE: 72 BPM | BODY MASS INDEX: 24.61 KG/M2 | HEIGHT: 65 IN

## 2025-01-13 DIAGNOSIS — Z30.430 ENCOUNTER FOR INSERTION OF INTRAUTERINE CONTRACEPTIVE DEVICE (IUD): Primary | ICD-10-CM

## 2025-01-13 LAB
B-HCG UR QL: NEGATIVE
CTP QC/QA: YES

## 2025-01-13 PROCEDURE — 87591 N.GONORRHOEAE DNA AMP PROB: CPT | Performed by: ADVANCED PRACTICE MIDWIFE

## 2025-01-13 PROCEDURE — 81025 URINE PREGNANCY TEST: CPT | Mod: S$GLB,,, | Performed by: ADVANCED PRACTICE MIDWIFE

## 2025-01-13 PROCEDURE — 99213 OFFICE O/P EST LOW 20 MIN: CPT | Mod: 25,S$GLB,, | Performed by: ADVANCED PRACTICE MIDWIFE

## 2025-01-13 PROCEDURE — 58300 INSERT INTRAUTERINE DEVICE: CPT | Mod: S$GLB,,, | Performed by: ADVANCED PRACTICE MIDWIFE

## 2025-01-13 NOTE — PROCEDURES
Insertion of IUD    Date/Time: 1/13/2025 2:00 PM    Performed by: Khalida Billy CNM  Authorized by: Khalida Billy CNM    Consent:     Consent given by:  Patient    Procedure risks and benefits discussed: yes      Patient questions answered: yes      Patient agrees, verbalizes understanding, and wants to proceed: yes     Device to be inserted was verified by patient: yes    Educational handouts given: yes      Instructions and paperwork completed: yes    Insertion Procedure:   17.5 mcg levonorgestreL 19.5 mg       Pelvic exam performed: yes      Negative GC/chlamydia test: culture done.      Negative urine pregnancy test: yes      Cervix cleaned and prepped: yes      Speculum placed in vagina: yes      Tenaculum applied to cervix: yes      Uterus sounded: yes      Uterus sound depth (cm):  6    IUD inserted with no complications: yes      IUD type:  Kyleena    Strings trimmed: yes    Post-procedure:     Patient tolerated procedure well: yes      Patient will follow up after next period: ONE MONTH STRING CHECK.    Upt neg prior to insertion and pt denies intercourse since her birth

## 2025-01-14 LAB
C TRACH DNA SPEC QL NAA+PROBE: NOT DETECTED
N GONORRHOEA DNA SPEC QL NAA+PROBE: NOT DETECTED

## 2025-02-06 ENCOUNTER — OFFICE VISIT (OUTPATIENT)
Dept: OBSTETRICS AND GYNECOLOGY | Facility: CLINIC | Age: 30
End: 2025-02-06
Payer: COMMERCIAL

## 2025-02-06 VITALS
HEART RATE: 66 BPM | SYSTOLIC BLOOD PRESSURE: 103 MMHG | WEIGHT: 164.81 LBS | DIASTOLIC BLOOD PRESSURE: 67 MMHG | BODY MASS INDEX: 27.46 KG/M2 | HEIGHT: 65 IN

## 2025-02-06 DIAGNOSIS — Z30.431 IUD CHECK UP: Primary | ICD-10-CM

## 2025-02-06 PROCEDURE — 99999 PR PBB SHADOW E&M-EST. PATIENT-LVL III: CPT | Mod: PBBFAC,,, | Performed by: ADVANCED PRACTICE MIDWIFE

## 2025-02-06 PROCEDURE — 3074F SYST BP LT 130 MM HG: CPT | Mod: CPTII,S$GLB,, | Performed by: ADVANCED PRACTICE MIDWIFE

## 2025-02-06 PROCEDURE — 1159F MED LIST DOCD IN RCRD: CPT | Mod: CPTII,S$GLB,, | Performed by: ADVANCED PRACTICE MIDWIFE

## 2025-02-06 PROCEDURE — 3008F BODY MASS INDEX DOCD: CPT | Mod: CPTII,S$GLB,, | Performed by: ADVANCED PRACTICE MIDWIFE

## 2025-02-06 PROCEDURE — 99212 OFFICE O/P EST SF 10 MIN: CPT | Mod: S$GLB,,, | Performed by: ADVANCED PRACTICE MIDWIFE

## 2025-02-06 PROCEDURE — 3078F DIAST BP <80 MM HG: CPT | Mod: CPTII,S$GLB,, | Performed by: ADVANCED PRACTICE MIDWIFE

## 2025-02-06 NOTE — PROGRESS NOTES
Adina Jenkins is a 29 y.o. female  presents today for intrauterine device check up following placement of Kylena  IUD 1 week ago.  She is not having problems with bleeding, cramping, fever or discharge.    ROS:  GENERAL: No fever, chills, fatigability or weight loss.  VULVAR: No pain, no lesions and no itching.  VAGINAL: No relaxation, no itching, no odor, and no lesions.  ABDOMEN: No abdominal pain. Denies nausea. Denies vomiting. No diarrhea. No constipation  BREAST: Denies pain. No lumps. No discharge.  URINARY: No incontinence, no nocturia, no frequency and no dysuria.  CARDIOVASCULAR: No chest pain. No shortness of breath. No leg cramps.  NEUROLOGICAL: No headaches. No vision changes.  Next PAP due in 2025 pt aware     Review of patient's allergies indicates:  No Known Allergies    Current Outpatient Medications:     acetaminophen (TYLENOL) 325 MG tablet, Take 2 tablets (650 mg total) by mouth every 6 (six) hours., Disp: 60 tablet, Rfl: 0    docusate sodium (COLACE) 100 MG capsule, Take 2 capsules (200 mg total) by mouth 2 (two) times daily as needed for Constipation., Disp: 14 capsule, Rfl: 0    ibuprofen (ADVIL,MOTRIN) 600 MG tablet, Take 1 tablet (600 mg total) by mouth every 6 (six) hours as needed for Pain., Disp: 60 tablet, Rfl: 0    prenat.vits,jak,min-iron-folic Tab, Take by mouth., Disp: , Rfl:     Current Facility-Administered Medications:     levonorgestreL (Kyleena) 19.5 mg IUD 17.5 mcg, 17.5 mcg, Intrauterine, , , 17.5 mcg at 25 1400    Past Medical History:   Diagnosis Date    Abnormal Pap smear of cervix     required repeat pap and normal, +HPV (garsasil vaccinated)    Anxiety disorder, unspecified     seeing therapist    Gestational diabetes     Irregular menstrual cycle 2023     History reviewed. No pertinent surgical history.  Social History     Tobacco Use    Smoking status: Never    Smokeless tobacco: Never   Substance Use Topics    Alcohol use: Not Currently     "Drug use: Never     OB History    Para Term  AB Living   2 1 1   1 1   SAB IAB Ectopic Multiple Live Births   1     0 1      # Outcome Date GA Lbr Mitchell/2nd Weight Sex Type Anes PTL Lv   2 Term 24 39w4d  3.32 kg (7 lb 5.1 oz) M Vag-Spont None N MARIO   1 SAB 24     SAB          /67   Pulse 66   Ht 5' 5" (1.651 m)   Wt 74.7 kg (164 lb 12.7 oz)   LMP  (LMP Unknown)   Breastfeeding Yes   BMI 27.42 kg/m²     PHYSICAL EXAM:  GENERAL: Calm and appropriate affect, alert, oriented x4  SKIN: Color appropriate for race, warm and dry, clean and intact with no rashes.  RESP: Even, unlabored breathing  ABDOMEN: Soft, nontender.  :   Normal external female genitalia without lesions. Normal hair distribution. Adequate perineal body, normal urethral meatus.  Vagina pink and well rugated, no lesions, no vaginal discharge.  Cervix pink without discharge or lesions, no cervical motion tenderness.  IUD threads were visualized and palpable.  Uterus and adnexa nontender.    ASSESSMENT / PLAN:    ICD-10-CM ICD-9-CM    1. IUD check up  Z30.431 V25.42         Patient was counseled today on how to regularly check for IUD threads, along with warning signs and symptoms to follow up for.      FOLLOW UP:   Routine GYN exam in October   "

## 2025-02-07 ENCOUNTER — CLINICAL SUPPORT (OUTPATIENT)
Dept: REHABILITATION | Facility: OTHER | Age: 30
End: 2025-02-07
Attending: ADVANCED PRACTICE MIDWIFE
Payer: COMMERCIAL

## 2025-02-07 DIAGNOSIS — N81.89 PELVIC FLOOR WEAKNESS: ICD-10-CM

## 2025-02-07 PROCEDURE — 97530 THERAPEUTIC ACTIVITIES: CPT

## 2025-02-07 PROCEDURE — 97161 PT EVAL LOW COMPLEX 20 MIN: CPT

## 2025-02-07 NOTE — PROGRESS NOTES
[facility]   Pelvic Health Physical Therapy Initial Evaluation        Date: 2025   Name: Adina Jenkins  Clinic Number: [id number]    Therapy Diagnosis:   Encounter Diagnosis   Name Primary?    Pelvic floor weakness      Referring Provider: Khalida Billy CNM    Referring Provider Orders: PT Eval and Treat  Medical Diagnosis from Referral:   1. Pelvic floor weakness      Evaluation Date: 2025  Authorization Period Expiration: 2025  Plan of Care Expiration: 2025  Visit # / Visits authorized: 1/pending  FOTO: 1/3 (2025)    Precautions: standard    Time In: 9:00 am  Time Out: 9:45 am  Total Appointment Time (timed & untimed codes): 45 minutes    SUBJECTIVE     Date of onset:   History of current condition: Adina fernando gave birth 3 months ago. It was as very fast birth, her water broke and her son came 90 minutes later. Breast feeding. Just got an IUD placed- 1 month ago    OB/GYN HISTORY -     BLADDER HISTORY  Frequency of urination:   Day: 5-6           Night: 0  Difficulty initiating urine stream: No  Urine stream: weak  Complete emptying: Yes  Post-void dribbling: No  Urinary Urgency: No  Bladder leakage: No    BOWEL HISTORY  Frequency of bowel movements: once a day  Difficulty initiating BM: No  Quality/Shape of BM: [place] Stool Chart type 2 or type 3   Incomplete Emptying? No  Fiber Supplements or Laxative Use? No    SEXUAL/PELVIC PAIN HISTORY  Sexually active? No   Pain with vaginal exams, intercourse or tampon use? No  Tailbone Injury? Yes   Feeling of pelvic heaviness? No      Imaging: none pertinent to the pelvic floor    Prior Therapy: none for current compliant   Social History: lives in a house with steps to enter, with family   Current exercise: mostly walking   Occupation:    Prior Level of Function: ind with all ADL's   Current Level of Function: ind with all ADL's with pelvic floor dysfunction       Types of fluid intake: water, 30  "ounce tumbler, coffee and tea drinker   Diet: unremarkable  Habitus: well developed, well nourished  Abuse/Neglect: No     Patients goals: "I would like to get that vaginal wall better support"      Medical History: Adina  has a past medical history of Abnormal Pap smear of cervix (2015), Anxiety disorder, unspecified, Gestational diabetes, and Irregular menstrual cycle (03/09/2023).     Surgical History: Adina Jenkins  has no past surgical history on file.    Medications: Adina has a current medication list which includes the following prescription(s): acetaminophen, docusate sodium, ibuprofen, and prenat.vits,jak,min-iron-folic, and the following Facility-Administered Medications: levonorgestrel.    Allergies: Review of patient's allergies indicates:  No Known Allergies     OBJECTIVE     See EMR under MEDIA for written consent provided 2/7/2025  Chaperone: declined    ORTHO SCREEN  Posture in sitting: WNL  Posture in standing: WNL  Standing load transfer: FAIR pelvic girdle stability with single-leg balance    Hip Strength 2/7/2025   R hip flexion 4/5   R hip abduction 4/5   R hip external rotation 4/5   L hip flexion 4/5   L hip external rotation 4/5   L hip abduction  4/5     Hip Range of Motion 2/7/2025   R internal rotation WNL   L internal rotation WNL     ABDOMINAL WALL ASSESSMENT  Palpation: increased tension   Pelvic Girdle Stability: Pt demonstrates moderately impaired ability to stabilize pelvis with Active Straight Leg Raise Test. Able to improve slightly with verbal/manual cues for transverse abdominus activation.   Diastasis Recti: absent,  Motor Control: able to demonstrate appropriate transverse abdominis contraction on command, with verbal and tactile cues     BREATHING MECHANICS ASSESSMENT   Thorax Assessment During Quiet Respiration: WNL excursion of ribcage and WNL excursion of abdominal wall  Thorax Assessment During Deep Respiration: Decreased excursion of lateral ribs    VAGINAL PELVIC FLOOR " EXAM - external assessment  Introitus: gaping  Skin condition: redness noted  Scarring: other   Sensation: WNL   Pain: tenderness to palpation of right bulbocavernosus and coccygeus  Voluntary contraction: nil  Voluntary relaxation: nil  Bearing down: bulge     VAGINAL PELVIC FLOOR EXAM - internal assessment  Sensation: able to localized pressure appropriately   Vaginal vault: roomy   Muscle Bulk: increased tone  Muscle Power: 1/5  Muscle Endurance: 0 seconds    Quality of contraction: decreased hold   Specificity: patient contracts: glutes and abdominals    Coordination: tends to hold breath during PFM contration   Prolapse: grade 2, anterior    Limitation/Restriction for FOTO Pelvic Floor Surveys    Therapist reviewed FOTO scores for Adina Jenkins on 2/7/2025  FOTO documents entered into [employer] - see Media section.    Limitation Score:   See FOTO     TREATMENT     Total Treatment time (time-based codes) separate from the evaluation: 15 minutes     Therapeutic activities to improve functional performance for 15 minutes -  [x] Instruction on self-release to superficial PFM to reduce discomfort with initial penetration - pt able to return demonstrate independently after initial instruction  [x] Discussion of intercourse considerations for pelvic pain - lubricant, positioning, pillows, pelvic floor massage  [x] Instruction on urge suppression techniques  [x] Instruction on diaphragmatic breathing and hip-opening stretches for pelvic floor relaxation  [x] Education on visual cues/mental imagery for pelvic floor relaxation  [x] Education on techniques to reduce post-void dribble  [x] Instruction on the Knack for reduction of stress urinary incontinence  [x] Instruction on log-roll technique for transfers to reduce strain on back/abdominal wall, reduce incidence of incontinence  [x] Instruction on pressure management to protect pelvic organ prolapse/prevent incontinence  [x] Education on voiding optimization - seated on  toilet, no pushing, pelvic floor squeeze upon completion  [x] Education on bowel movement optimization - positioning, toilet stool, breath coordination, pelvic floor relaxation, abdominal wall bracing  [x] HEP building/HEP review    PATIENT EDUCATION AND HOME EXERCISES     Education provided: general anatomy/physiology of urinary & bowel system, benefits of treatment, and alternative methods of treatment were discussed with the patient. Additionally, Adina was provided education on pt prognosis, PT plan of care, pelvic floor anatomy & function, pelvic floor muscle assessment, hormonal changes during pregnancy/nursing & impact on ligamentous laxity, neuroplasticity, using breath to stimulate the vagus nerve, parasympathetic nervous system, pressure management for support of pelvic organ prolapse, and abdominal wall anatomy    Written Home Exercises provided: yes  Exercises were reviewed, and Adina was able to demonstrate them prior to the end of the session. Adina demonstrated good understanding of the education provided. See EMR under 'Patient Instructions' for exercises and education provided during session.    ASSESSMENT     Adina is a 29 y.o. female referred to outpatient physical therapy with a medical diagnosis of  Pelvic floor weakness [N81.89] . Pt presents with deficits to pelvic floor muscle strength, endurance, and coordination, as well as hip weakness, functional core weakness, and pelvic girdle stability deficits. Symptoms appear consistent with pelvic floor dysfunction, . Symptoms impair the patient's ability to perform ADLs and functional mobility    Patient prognosis is good  Adina will benefit from skilled outpatient physical therapy to address the deficits stated above and in the chart below, provide patient/family education, and to maximize the patient's level of independence.     Plan of care discussed with patient: yes  Patient's spiritual, cultural and educational needs considered, and the  patient is agreeable to the plan of care and goals as stated below:     Anticipated Barriers for therapy: none    Medical Necessity is demonstrated by the following -  History  Co-morbidities and personal factors that may impact the plan of care Co-morbidities   None    Personal Factors  no deficits     low   Examination  Body structures and functions, activity limitations and participation restrictions that may impact the plan of care Body Regions/Systems/Functions:  pelvic floor tenderness, decreased pelvic muscle strength, decreased endurance of the pelvic muscles, poor quality of pelvic muscle contraction, and increased frequency of urination     Activity Limitations:  Initiating urine stream and Participating in social activities and ADLs due to pelvic pressure    Participation Restrictions:  Pelvic pressure with ADLs.     Activity limitations:   Learning and applying knowledge  no deficits    General Tasks and Commands  no deficits    Communication  communicating with/receiving non-verbal language  no deficits    Mobility  no deficits    Self care  no deficits    Domestic Life  no deficits    Interactions/Relationships  no deficits    Life Areas  no deficits    Community and Social Life  Community life, recreation & leisure       low   Clinical Presentation stable and uncomplicated low   Decision Making/ Complexity Score: low         Short-Term Goals:  Patient will demonstrate proper pelvic floor muscle contraction and relaxation with verbal and tactile cueing in 4/5 attempts within 2 weeks to improve pelvic floor coordination.  Patient will perform diaphragmatic breathing with proper intra-abdominal pressure management in supine and sitting positions in 4/5 trials within 3 weeks to improve pressure regulation.  Patient will complete 10 repetitions of isolated pelvic floor contractions in coordination with exhalation with 80% accuracy within 4 weeks to improve pelvic floor muscle activation.  Patient will  report a reduction in feelings of pelvic heaviness by 50% during daily activities within 4 weeks to improve symptom management and function.  Patient will perform 10 repetitions of a squat with proper core and pelvic floor engagement with verbal cueing within 5 weeks to improve strength and endurance for functional movements.  Long-Term Goals:  Patient will perform pelvic floor contractions correctly and independently in 4/5 trials during functional activities within 12 weeks to improve coordination and prevent worsening of prolapse symptoms.  Patient will tolerate standing and walking for 30 consecutive minutes without reports of pelvic heaviness within 12 weeks to improve daily mobility and participation in activities.  Patient will perform a progressive strengthening program including core and hip exercises 3x per week for 8 out of 10 weeks to improve pelvic floor and core stability.  Patient will demonstrate proper intra-abdominal pressure management with lifting and bending activities with 80% accuracy within 14 weeks to improve body mechanics and reduce symptom exacerbation.  Patient will report maintaining symptom improvements and confidence in managing her prolapse and pelvic floor function independently within 16 weeks to improve long-term self-management and quality of life.    PLAN     Plan of Care certification: 2/7/2025 to 05/07/2025    Outpatient Physical Therapy - 1 time per week for 12 weeks to include the following interventions: Therapeutic Exercise, Manual Therapy, Therapeutic Activity, Neuromuscular Re-education, Electrical Stimulation Unattended, Self-Care, Patient Education Progress HEP towards D/C. Recommend F/U with MD if symptoms worsen or do not resolve. Patient may be seen by a PTA for treatment to carry out their plan of care.  Face-to-face conferences will be held.        Elizabeth Wagner, PT, DPT            I CERTIFY THE NEED FOR THESE SERVICES FURNISHED UNDER THIS PLAN OF TREATMENT AND  WHILE UNDER MY CARE   Physician's comments:     Physician's Signature: ___________________________________________________

## 2025-02-07 NOTE — PATIENT INSTRUCTIONS
Bowel Movement Body Mechanics  1. Sit on the toilet comfortably with legs and buttocks relaxed.  2. Put your feet on a step stool or squatty potty (~8 inches tall).  3. Lean forward while keeping your back straight and rest your elbows on your knees.  4. Keep your knees apart and place your hands on your belly  5. Inhale and make your belly big  6. Make the belly hard as you exhale like you are blowing out birthday candles while you gently bear down.   *Press the hands over the lower belly to prevent it from pushing out with bearing down  *Do not strain, do not hold your breath.      Your pelvic floor muscles and your diaphragm work closely together to regulate the pressure in your body. Each time you inhale, your diaphragm contracts, flattens, and moves downward. In response, your pelvic floor muscles relax and drop down as well. When you exhale, both muscles lift upwards. Throughout a diaphragmatic breath cycle, your pelvic floor goes through a full range of motion that contributes to its optimal function.    Think of your body like a canister, with one opening at the top where your mouth is and another opening at the bottom through your pelvic floor. If the top of the canister is closed off (as with straining during bowel movements or Valsalva maneuver during lifting) all of the pressure moves downward. If your pelvic floor muscles are not strong enough to counteract this increased pressure, you may experience leaking or increased sensations of pelvic organ prolapse.     This is why it is extremely important to implement the following pressure management techniques:  Avoiding Constipation - make high-fiber foods part of your regular diet (fruits, vegetables, bran, and beans), reduce the amount of processed foods in your diet, drink plenty of water and fluids every day, exercise daily, and manage your stress with meditation or other relaxation techniques.  No Straining! Straining (bearing down and holding your  breath) puts additional downward pressure on our organs, causing increased prolapse and pelvic pressure. Instead, blow out the candles as you gently push down. Do NOT hold your breath!  Use a Stool - Utilize a squat position when voiding. Get your knees up higher than your hips. Squatting helps relax the pelvic floor muscles and reduces straining.  Avoid heavy lifting and high-impact activities until you can strengthen your core and pelvic floor muscles appropriately. When you do have to lift, hold the load close to your body to reduce strain and do not hold your breath when lifting.  Do not Valsalva (hold your breath and push) at any time.  Use diaphragmatic breathing (belly breathing) to manage stress, help empty bladder, help empty bowels, and help lightly stretch pelvic floor muscles.   Gently squeeze your pelvic floor muscles prior to exertion (coughing, sneezing, lifting, transferring to stand) and avoid holding your breath

## 2025-03-03 ENCOUNTER — PATIENT MESSAGE (OUTPATIENT)
Dept: OBSTETRICS AND GYNECOLOGY | Facility: CLINIC | Age: 30
End: 2025-03-03
Payer: COMMERCIAL

## 2025-03-03 DIAGNOSIS — Z02.1 ENCOUNTER FOR PRE-EMPLOYMENT EXAMINATION: Primary | ICD-10-CM

## 2025-03-06 ENCOUNTER — CLINICAL SUPPORT (OUTPATIENT)
Dept: REHABILITATION | Facility: OTHER | Age: 30
End: 2025-03-06
Payer: COMMERCIAL

## 2025-03-06 ENCOUNTER — LAB VISIT (OUTPATIENT)
Dept: LAB | Facility: OTHER | Age: 30
End: 2025-03-06
Attending: INTERNAL MEDICINE
Payer: COMMERCIAL

## 2025-03-06 DIAGNOSIS — Z02.1 ENCOUNTER FOR PRE-EMPLOYMENT EXAMINATION: ICD-10-CM

## 2025-03-06 DIAGNOSIS — N81.89 PELVIC FLOOR WEAKNESS: Primary | ICD-10-CM

## 2025-03-06 PROCEDURE — 86762 RUBELLA ANTIBODY: CPT | Performed by: INTERNAL MEDICINE

## 2025-03-06 PROCEDURE — 97112 NEUROMUSCULAR REEDUCATION: CPT

## 2025-03-06 PROCEDURE — 86735 MUMPS ANTIBODY: CPT | Performed by: INTERNAL MEDICINE

## 2025-03-06 PROCEDURE — 86765 RUBEOLA ANTIBODY: CPT | Performed by: INTERNAL MEDICINE

## 2025-03-06 PROCEDURE — 36415 COLL VENOUS BLD VENIPUNCTURE: CPT | Performed by: INTERNAL MEDICINE

## 2025-03-06 NOTE — PROGRESS NOTES
"[facility]   Physical Therapy Treatment Note      Name: Adina Jenkins  Clinic Number: [id number]    Therapy Diagnosis:   Encounter Diagnosis   Name Primary?    Pelvic floor weakness Yes     Physician: Khalida Billy CNM    Visit Date: 3/6/2025  Referring Provider Orders: PT Eval and Treat  Medical Diagnosis from Referral:   1. Pelvic floor weakness       Evaluation Date: 2/7/2025  Authorization Period Expiration: 12/20/2025  Plan of Care Expiration: 05/07/2025  Visit # / Visits authorized: 1/pending  FOTO: 1/3 (2/7/2025)    Time In: 9:30 am  Time Out: 10: 15 am  Total Billable Time: 45 minutes    Precautions: Standard    Subjective     Pt reports: she has been using her squatty potty which helps things flow out easier .  She was compliant with home exercise program.  Response to previous treatment: improved pelvic floor awareness         Objective      Objective Measures updated at progress report unless specified.       Treatment   Adina received the treatments listed below:      Adina received therapeutic exercises to develop  strength, endurance, ROM, flexibility, posture, and core stabilization for 0 minutes including:       Adina received the following manual therapy techniques: to develop flexibility, extensibility, and desensitization for 0 minutes including:         Adina participated in neuromuscular re-education activities to develop Coordination, Control, and Proprioception for 45 minutes including:   [x][ ] Kegels with assist of SEMG   [x]pelvic floor relaxation speed after performing a kegel   [x]diaphragmatic breathing with Kegel   [x] Kegel edu and practice.  Increased time spent on edu on proper technique.  Pt improves with practice and verbal cues.  [x] PFM squeeze + hip adduction 10x10"  [x] PFM squeeze + TrA activation 10x10"  [x] PFM squeeze + bridge + hip adduction 10x10"      Adina participated in dynamic functional therapeutic activities to improve functional " performance for 5  minutes, including:  [x] Plan of care review  [x] Anatomy review and discussion of the anatomy on current level of function   [] Education on visual cues/mental imagery for pelvic floor relaxation  [] Education on visual cues/mental imagery for pelvic floor activation  [] Coordination of kegels with functional activities such as cough, laugh, sneeze, lift, etc.   [] Pt edu in the Roll for Control technique to decrease incontinence with strong urge.  Practiced new technique in sitting and standing.    [x] Home exercise program issued and reviewed     Home Exercises Provided and Patient Education Provided     Education provided: See above  Discussed progression of plan of care with patient; educated pt in activity modification; reviewed HEP with pt. Pt demonstrated and verbalized understanding of all instruction and was provided with a handout of HEP (see Patient Instructions).    Written Home Exercises Provided: yes.  Exercises were reviewed and Adina was able to demonstrate them prior to the end of the session.  Adina demonstrated good  understanding of the education provided.     See EMR under Patient Instructions for exercises provided 3/6/2025.    Assessment     The patient was seen for continued treatment addressing pelvic floor weakness and poor coordination, requiring skilled PT services to improve motor control and strength for functional activities. The session focused on sEMG biofeedback training to enhance pelvic floor muscle awareness and activation, emphasizing coordination with diaphragmatic breathing for improved neuromuscular control. Proximal hip strengthening exercises were integrated to support pelvic stability and optimize load transfer through the core and lower extremities. Patient demonstrated improved ability to engage the pelvic floor with proper timing but continues to require verbal and tactile cues for relaxation following contraction. Education was provided on proper  home exercise program execution to reinforce coordination strategies. Continued emphasis will be placed on improving endurance, motor control, and functional integration of pelvic floor activation.      Adina Is progressing well towards her goals.   Pt prognosis is Good.     Pt will continue to benefit from skilled outpatient physical therapy to address the deficits listed in the problem list box on initial evaluation, provide pt/family education and to maximize pt's level of independence in the home and community environment.     Pt's spiritual, cultural and educational needs considered and pt agreeable to plan of care and goals.     Anticipated barriers to physical therapy: None    Goals:   Short-Term Goals:  Patient will demonstrate proper pelvic floor muscle contraction and relaxation with verbal and tactile cueing in 4/5 attempts within 2 weeks to improve pelvic floor coordination.  Patient will perform diaphragmatic breathing with proper intra-abdominal pressure management in supine and sitting positions in 4/5 trials within 3 weeks to improve pressure regulation.  Patient will complete 10 repetitions of isolated pelvic floor contractions in coordination with exhalation with 80% accuracy within 4 weeks to improve pelvic floor muscle activation.  Patient will report a reduction in feelings of pelvic heaviness by 50% during daily activities within 4 weeks to improve symptom management and function.  Patient will perform 10 repetitions of a squat with proper core and pelvic floor engagement with verbal cueing within 5 weeks to improve strength and endurance for functional movements.  Long-Term Goals:  Patient will perform pelvic floor contractions correctly and independently in 4/5 trials during functional activities within 12 weeks to improve coordination and prevent worsening of prolapse symptoms.  Patient will tolerate standing and walking for 30 consecutive minutes without reports of pelvic heaviness within  12 weeks to improve daily mobility and participation in activities.  Patient will perform a progressive strengthening program including core and hip exercises 3x per week for 8 out of 10 weeks to improve pelvic floor and core stability.  Patient will demonstrate proper intra-abdominal pressure management with lifting and bending activities with 80% accuracy within 14 weeks to improve body mechanics and reduce symptom exacerbation.  Patient will report maintaining symptom improvements and confidence in managing her prolapse and pelvic floor function independently within 16 weeks to improve long-term self-management and quality of life.    Plan     Continue with established Plan of Care, working toward established PT goals.      At next visit:     Elizabeth Wagner, PT

## 2025-03-07 LAB
MUMPS IGG INTERPRETATION: POSITIVE
MUMPS IGG SCREEN: 172 AU/ML
RUBEOLA IGG ANTIBODY: 49.2 AU/ML
RUBEOLA INTERPRETATION: POSITIVE
RUBV IGG SER-ACNC: 28.5 IU/ML
RUBV IGG SER-IMP: REACTIVE

## 2025-03-19 ENCOUNTER — CLINICAL SUPPORT (OUTPATIENT)
Dept: REHABILITATION | Facility: OTHER | Age: 30
End: 2025-03-19
Payer: COMMERCIAL

## 2025-03-19 DIAGNOSIS — N81.89 PELVIC FLOOR WEAKNESS: Primary | ICD-10-CM

## 2025-03-19 PROCEDURE — 97112 NEUROMUSCULAR REEDUCATION: CPT

## 2025-03-19 NOTE — PROGRESS NOTES
"[facility]   Physical Therapy Treatment Note      Name: Adina Jenkins  Clinic Number: [id number]    Therapy Diagnosis:   Encounter Diagnosis   Name Primary?    Pelvic floor weakness Yes       Physician: Khalida Billy CNM    Visit Date: 3/19/2025  Referring Provider Orders: PT Eval and Treat  Medical Diagnosis from Referral:   1. Pelvic floor weakness       Evaluation Date: 2/7/2025  Authorization Period Expiration: 12/20/2025  Plan of Care Expiration: 05/07/2025  Visit # / Visits authorized: 2/5   FOTO: 1/3 (2/7/2025)    Time In: 2:45 pm  Time Out: 3:30 pm  Total Billable Time: 45 minutes    Precautions: Standard    Subjective     Pt reports: she has been using her squatty potty which helps things flow out easier .  She was compliant with home exercise program.  Response to previous treatment: improved pelvic floor awareness         Objective      Objective Measures updated at progress report unless specified.       Treatment   Adina received the treatments listed below:      Adina received therapeutic exercises to develop  strength, endurance, ROM, flexibility, posture, and core stabilization for 0 minutes including:       Adina received the following manual therapy techniques: to develop flexibility, extensibility, and desensitization for 0 minutes including:         Adina participated in neuromuscular re-education activities to develop Coordination, Control, and Proprioception for 45 minutes including:   [x][ ] Kegels with assist of SEMG   [x]pelvic floor relaxation speed after performing a kegel   [x]diaphragmatic breathing with Kegel   [x] Kegel edu and practice.  Increased time spent on edu on proper technique.  Pt improves with practice and verbal cues.  [x] PFM squeeze + hip adduction 10x10"  [] PFM squeeze + TrA activation 10x10"  [] PFM squeeze + bridge + hip adduction 10x10"  [x] PFM squeeze + sitting 10x10"  [x] PFM squeeze + standing 10x10"  [x] PFM squeeze +quick flicks 2" x " "10  [x] PFM squeeze + 1 second on 1 second off 10x10"      Adina participated in dynamic functional therapeutic activities to improve functional performance for 5  minutes, including:  [x] Plan of care review  [x] Anatomy review and discussion of the anatomy on current level of function   [] Education on visual cues/mental imagery for pelvic floor relaxation  [] Education on visual cues/mental imagery for pelvic floor activation  [] Coordination of kegels with functional activities such as cough, laugh, sneeze, lift, etc.   [] Pt edu in the Roll for Control technique to decrease incontinence with strong urge.  Practiced new technique in sitting and standing.    [x] Home exercise program issued and reviewed     Home Exercises Provided and Patient Education Provided     Education provided: See above  Discussed progression of plan of care with patient; educated pt in activity modification; reviewed HEP with pt. Pt demonstrated and verbalized understanding of all instruction and was provided with a handout of HEP (see Patient Instructions).    Written Home Exercises Provided: yes.  Exercises were reviewed and Adina was able to demonstrate them prior to the end of the session.  Adina demonstrated good  understanding of the education provided.     See EMR under Patient Instructions for exercises provided 3/6/2025.    Assessment     The patient was seen for pelvic floor weakness and poor coordination, necessitating skilled PT services to facilitate proper muscle recruitment and enhance pelvic floor function. Todays session focused on incorporating quick flick training to improve fast-twitch muscle fiber activation, addressing the patients ability to respond to sudden increases in intra-abdominal pressure. Endurance training was also introduced to build sustained pelvic floor engagement, with sEMG biofeedback utilized to provide real-time visual feedback and ensure accurate muscle activation. Skilled intervention remains " necessary to guide exercise progression, interpret biofeedback results, and adjust training parameters to optimize strength, coordination, and functional outcomes.    Adina Is progressing well towards her goals.   Pt prognosis is Good.     Pt will continue to benefit from skilled outpatient physical therapy to address the deficits listed in the problem list box on initial evaluation, provide pt/family education and to maximize pt's level of independence in the home and community environment.     Pt's spiritual, cultural and educational needs considered and pt agreeable to plan of care and goals.     Anticipated barriers to physical therapy: None    Goals:   Short-Term Goals:  Patient will demonstrate proper pelvic floor muscle contraction and relaxation with verbal and tactile cueing in 4/5 attempts within 2 weeks to improve pelvic floor coordination.  Patient will perform diaphragmatic breathing with proper intra-abdominal pressure management in supine and sitting positions in 4/5 trials within 3 weeks to improve pressure regulation.  Patient will complete 10 repetitions of isolated pelvic floor contractions in coordination with exhalation with 80% accuracy within 4 weeks to improve pelvic floor muscle activation.  Patient will report a reduction in feelings of pelvic heaviness by 50% during daily activities within 4 weeks to improve symptom management and function.  Patient will perform 10 repetitions of a squat with proper core and pelvic floor engagement with verbal cueing within 5 weeks to improve strength and endurance for functional movements.  Long-Term Goals:  Patient will perform pelvic floor contractions correctly and independently in 4/5 trials during functional activities within 12 weeks to improve coordination and prevent worsening of prolapse symptoms.  Patient will tolerate standing and walking for 30 consecutive minutes without reports of pelvic heaviness within 12 weeks to improve daily mobility  and participation in activities.  Patient will perform a progressive strengthening program including core and hip exercises 3x per week for 8 out of 10 weeks to improve pelvic floor and core stability.  Patient will demonstrate proper intra-abdominal pressure management with lifting and bending activities with 80% accuracy within 14 weeks to improve body mechanics and reduce symptom exacerbation.  Patient will report maintaining symptom improvements and confidence in managing her prolapse and pelvic floor function independently within 16 weeks to improve long-term self-management and quality of life.    Plan     Continue with established Plan of Care, working toward established PT goals.      At next visit:     Elizabeth Wagner, PT

## 2025-09-05 ENCOUNTER — TELEPHONE (OUTPATIENT)
Dept: OBSTETRICS AND GYNECOLOGY | Facility: HOSPITAL | Age: 30
End: 2025-09-05
Payer: COMMERCIAL